# Patient Record
Sex: MALE | Race: WHITE | NOT HISPANIC OR LATINO | Employment: UNEMPLOYED | URBAN - METROPOLITAN AREA
[De-identification: names, ages, dates, MRNs, and addresses within clinical notes are randomized per-mention and may not be internally consistent; named-entity substitution may affect disease eponyms.]

---

## 2018-05-30 ENCOUNTER — OFFICE VISIT CONVERTED (OUTPATIENT)
Dept: PODIATRY | Facility: CLINIC | Age: 48
End: 2018-05-30
Attending: PODIATRIST

## 2018-06-13 ENCOUNTER — PROCEDURE VISIT CONVERTED (OUTPATIENT)
Dept: PODIATRY | Facility: CLINIC | Age: 48
End: 2018-06-13
Attending: PODIATRIST

## 2018-06-13 ENCOUNTER — CONVERSION ENCOUNTER (OUTPATIENT)
Dept: PODIATRY | Facility: CLINIC | Age: 48
End: 2018-06-13

## 2018-07-06 ENCOUNTER — PROCEDURE VISIT CONVERTED (OUTPATIENT)
Dept: PODIATRY | Facility: CLINIC | Age: 48
End: 2018-07-06
Attending: PODIATRIST

## 2018-07-26 ENCOUNTER — PROCEDURE VISIT CONVERTED (OUTPATIENT)
Dept: PODIATRY | Facility: CLINIC | Age: 48
End: 2018-07-26
Attending: PODIATRIST

## 2018-08-21 ENCOUNTER — CONVERSION ENCOUNTER (OUTPATIENT)
Dept: PODIATRY | Facility: CLINIC | Age: 48
End: 2018-08-21

## 2018-08-21 ENCOUNTER — PROCEDURE VISIT CONVERTED (OUTPATIENT)
Dept: PODIATRY | Facility: CLINIC | Age: 48
End: 2018-08-21
Attending: PODIATRIST

## 2018-11-08 ENCOUNTER — CONVERSION ENCOUNTER (OUTPATIENT)
Dept: OTHER | Facility: HOSPITAL | Age: 48
End: 2018-11-08

## 2018-11-08 ENCOUNTER — OFFICE VISIT CONVERTED (OUTPATIENT)
Dept: PODIATRY | Facility: CLINIC | Age: 48
End: 2018-11-08
Attending: PODIATRIST

## 2019-01-02 ENCOUNTER — HOSPITAL ENCOUNTER (OUTPATIENT)
Dept: WOUND CARE | Facility: HOSPITAL | Age: 49
Setting detail: RECURRING SERIES
Discharge: HOME | End: 2019-01-31
Attending: SURGERY

## 2019-01-02 LAB
GLUCOSE BLD-MCNC: 161 MG/DL (ref 70–99)
GLUCOSE BLD-MCNC: 255 MG/DL (ref 70–99)

## 2019-01-03 LAB
GLUCOSE BLD-MCNC: 129 MG/DL (ref 70–99)
GLUCOSE BLD-MCNC: 139 MG/DL (ref 70–99)

## 2019-01-04 LAB
GLUCOSE BLD-MCNC: 144 MG/DL (ref 70–99)
GLUCOSE BLD-MCNC: 172 MG/DL (ref 70–99)

## 2019-01-07 LAB
GLUCOSE BLD-MCNC: 177 MG/DL (ref 70–99)
GLUCOSE BLD-MCNC: 219 MG/DL (ref 70–99)

## 2019-01-08 LAB
GLUCOSE BLD-MCNC: 104 MG/DL (ref 70–99)
GLUCOSE BLD-MCNC: 150 MG/DL (ref 70–99)
GLUCOSE BLD-MCNC: 169 MG/DL (ref 70–99)

## 2019-01-09 LAB
GLUCOSE BLD-MCNC: 138 MG/DL (ref 70–99)
GLUCOSE BLD-MCNC: 152 MG/DL (ref 70–99)

## 2019-01-10 LAB — GLUCOSE BLD-MCNC: 167 MG/DL (ref 70–99)

## 2019-01-11 LAB
GLUCOSE BLD-MCNC: 161 MG/DL (ref 70–99)
GLUCOSE BLD-MCNC: 180 MG/DL (ref 70–99)

## 2019-01-14 LAB
GLUCOSE BLD-MCNC: 166 MG/DL (ref 70–99)
GLUCOSE BLD-MCNC: 225 MG/DL (ref 70–99)

## 2019-01-15 LAB
GLUCOSE BLD-MCNC: 204 MG/DL (ref 70–99)
GLUCOSE BLD-MCNC: 234 MG/DL (ref 70–99)

## 2019-01-16 LAB
GLUCOSE BLD-MCNC: 100 MG/DL (ref 70–99)
GLUCOSE BLD-MCNC: 136 MG/DL (ref 70–99)

## 2019-01-17 LAB
GLUCOSE BLD-MCNC: 186 MG/DL (ref 70–99)
GLUCOSE BLD-MCNC: 238 MG/DL (ref 70–99)

## 2019-01-18 LAB
GLUCOSE BLD-MCNC: 182 MG/DL (ref 70–99)
GLUCOSE BLD-MCNC: 209 MG/DL (ref 70–99)

## 2019-01-21 LAB
GLUCOSE BLD-MCNC: 209 MG/DL (ref 70–99)
GLUCOSE BLD-MCNC: 242 MG/DL (ref 70–99)

## 2019-01-22 LAB
GLUCOSE BLD-MCNC: 230 MG/DL (ref 70–99)
GLUCOSE BLD-MCNC: 284 MG/DL (ref 70–99)

## 2019-01-23 LAB
GLUCOSE BLD-MCNC: 181 MG/DL (ref 70–99)
GLUCOSE BLD-MCNC: 217 MG/DL (ref 70–99)

## 2019-01-24 LAB
GLUCOSE BLD-MCNC: 216 MG/DL (ref 70–99)
GLUCOSE BLD-MCNC: 228 MG/DL (ref 70–99)

## 2019-01-25 LAB
GLUCOSE BLD-MCNC: 204 MG/DL (ref 70–99)
GLUCOSE BLD-MCNC: 215 MG/DL (ref 70–99)

## 2019-01-28 LAB
GLUCOSE BLD-MCNC: 122 MG/DL (ref 70–99)
GLUCOSE BLD-MCNC: 151 MG/DL (ref 70–99)

## 2019-01-29 LAB
GLUCOSE BLD-MCNC: 211 MG/DL (ref 70–99)
GLUCOSE BLD-MCNC: 272 MG/DL (ref 70–99)

## 2019-01-30 LAB
GLUCOSE BLD-MCNC: 202 MG/DL (ref 70–99)
GLUCOSE BLD-MCNC: 224 MG/DL (ref 70–99)

## 2019-01-31 LAB
GLUCOSE BLD-MCNC: 253 MG/DL (ref 70–99)
GLUCOSE BLD-MCNC: 300 MG/DL (ref 70–99)

## 2019-02-01 ENCOUNTER — HOSPITAL ENCOUNTER (OUTPATIENT)
Dept: WOUND CARE | Facility: HOSPITAL | Age: 49
Setting detail: RECURRING SERIES
Discharge: HOME | End: 2019-02-28
Attending: SURGERY

## 2019-02-01 LAB
GLUCOSE BLD-MCNC: 158 MG/DL (ref 70–99)
GLUCOSE BLD-MCNC: 222 MG/DL (ref 70–99)

## 2019-02-04 LAB
GLUCOSE BLD-MCNC: 162 MG/DL (ref 70–99)
GLUCOSE BLD-MCNC: 191 MG/DL (ref 70–99)

## 2019-02-05 LAB
GLUCOSE BLD-MCNC: 156 MG/DL (ref 70–99)
GLUCOSE BLD-MCNC: 187 MG/DL (ref 70–99)

## 2019-02-06 LAB
GLUCOSE BLD-MCNC: 143 MG/DL (ref 70–99)
GLUCOSE BLD-MCNC: 147 MG/DL (ref 70–99)

## 2019-02-07 LAB
GLUCOSE BLD-MCNC: 168 MG/DL (ref 70–99)
GLUCOSE BLD-MCNC: 200 MG/DL (ref 70–99)

## 2019-02-08 LAB
GLUCOSE BLD-MCNC: 207 MG/DL (ref 70–99)
GLUCOSE BLD-MCNC: 283 MG/DL (ref 70–99)

## 2019-02-11 LAB
GLUCOSE BLD-MCNC: 127 MG/DL (ref 70–99)
GLUCOSE BLD-MCNC: 196 MG/DL (ref 70–99)

## 2019-02-12 LAB
GLUCOSE BLD-MCNC: 229 MG/DL (ref 70–99)
GLUCOSE BLD-MCNC: 286 MG/DL (ref 70–99)

## 2019-02-13 LAB
GLUCOSE BLD-MCNC: 171 MG/DL (ref 70–99)
GLUCOSE BLD-MCNC: 196 MG/DL (ref 70–99)

## 2019-02-14 LAB
GLUCOSE BLD-MCNC: 176 MG/DL (ref 70–99)
GLUCOSE BLD-MCNC: 268 MG/DL (ref 70–99)

## 2019-02-15 LAB
GLUCOSE BLD-MCNC: 176 MG/DL (ref 70–99)
GLUCOSE BLD-MCNC: 178 MG/DL (ref 70–99)

## 2019-02-18 LAB
GLUCOSE BLD-MCNC: 178 MG/DL (ref 70–99)
GLUCOSE BLD-MCNC: 201 MG/DL (ref 70–99)

## 2019-02-19 LAB
GLUCOSE BLD-MCNC: 123 MG/DL (ref 70–99)
GLUCOSE BLD-MCNC: 248 MG/DL (ref 70–99)

## 2019-02-20 LAB
GLUCOSE BLD-MCNC: 154 MG/DL (ref 70–99)
GLUCOSE BLD-MCNC: 163 MG/DL (ref 70–99)

## 2019-02-21 LAB
GLUCOSE BLD-MCNC: 116 MG/DL (ref 70–99)
GLUCOSE BLD-MCNC: 188 MG/DL (ref 70–99)

## 2019-02-22 LAB
GLUCOSE BLD-MCNC: 125 MG/DL (ref 70–99)
GLUCOSE BLD-MCNC: 150 MG/DL (ref 70–99)

## 2019-02-25 LAB
GLUCOSE BLD-MCNC: 206 MG/DL (ref 70–99)
GLUCOSE BLD-MCNC: 98 MG/DL (ref 70–99)

## 2019-03-14 ENCOUNTER — HOSPITAL ENCOUNTER (OUTPATIENT)
Dept: WOUND CARE | Facility: HOSPITAL | Age: 49
Setting detail: RECURRING SERIES
Discharge: HOME | End: 2019-03-31
Attending: SURGERY

## 2019-04-09 ENCOUNTER — HOSPITAL ENCOUNTER (OUTPATIENT)
Dept: MRI IMAGING | Facility: HOSPITAL | Age: 49
Discharge: HOME | End: 2019-04-09
Attending: SURGERY

## 2019-04-09 LAB
CREAT BLD-MCNC: 1 MG/DL (ref 0.6–1.4)
GFR SERPLBLD BASED ON 1.73 SQ M-ARVRAT: >60 ML/MIN/{1.73_M2}

## 2019-04-23 ENCOUNTER — OFFICE VISIT CONVERTED (OUTPATIENT)
Dept: CARDIOLOGY | Facility: CLINIC | Age: 49
End: 2019-04-23
Attending: INTERNAL MEDICINE

## 2019-05-03 ENCOUNTER — CONVERSION ENCOUNTER (OUTPATIENT)
Dept: CARDIOLOGY | Facility: CLINIC | Age: 49
End: 2019-05-03
Attending: INTERNAL MEDICINE

## 2019-06-17 ENCOUNTER — HOSPITAL ENCOUNTER (OUTPATIENT)
Dept: SURGERY | Facility: HOSPITAL | Age: 49
Setting detail: HOSPITAL OUTPATIENT SURGERY
Discharge: HOME | End: 2019-06-17
Attending: OPHTHALMOLOGY

## 2019-06-17 LAB — GLUCOSE BLD-MCNC: 144 MG/DL (ref 70–99)

## 2020-11-23 ENCOUNTER — HOSPITAL ENCOUNTER (OUTPATIENT)
Dept: OTHER | Facility: HOSPITAL | Age: 50
Discharge: HOME | End: 2020-11-23
Attending: INTERNAL MEDICINE

## 2020-11-23 LAB
ALBUMIN SERPL-MCNC: 3.9 G/DL (ref 3.5–5)
ALBUMIN/GLOB SERPL: 1.1 {RATIO} (ref 1.4–2.6)
ALP SERPL-CCNC: 92 U/L (ref 56–119)
ALT SERPL-CCNC: 45 U/L (ref 10–40)
ANION GAP SERPL CALC-SCNC: 13 MMOL/L (ref 8–19)
AST SERPL-CCNC: 29 U/L (ref 15–50)
BASOPHILS # BLD AUTO: 0.06 10*3/UL (ref 0–0.2)
BASOPHILS NFR BLD AUTO: 1.1 % (ref 0–3)
BILIRUB SERPL-MCNC: 0.21 MG/DL (ref 0.2–1.3)
BUN SERPL-MCNC: 17 MG/DL (ref 5–25)
BUN/CREAT SERPL: 20 {RATIO} (ref 6–20)
CALCIUM SERPL-MCNC: 9.8 MG/DL (ref 8.7–10.4)
CHLORIDE SERPL-SCNC: 104 MMOL/L (ref 99–111)
CONV ABS IMM GRAN: 0.05 10*3/UL (ref 0–0.2)
CONV CO2: 28 MMOL/L (ref 22–32)
CONV IMMATURE GRAN: 0.9 % (ref 0–1.8)
CONV TOTAL PROTEIN: 7.3 G/DL (ref 6.3–8.2)
CREAT UR-MCNC: 0.85 MG/DL (ref 0.7–1.2)
CRP SERPL-MCNC: 25.7 MG/L (ref 0–5)
DEPRECATED RDW RBC AUTO: 46.5 FL (ref 35.1–43.9)
EOSINOPHIL # BLD AUTO: 0.26 10*3/UL (ref 0–0.7)
EOSINOPHIL # BLD AUTO: 4.7 % (ref 0–7)
ERYTHROCYTE [DISTWIDTH] IN BLOOD BY AUTOMATED COUNT: 14.3 % (ref 11.6–14.4)
ERYTHROCYTE [SEDIMENTATION RATE] IN BLOOD: 53 MM/H (ref 0–20)
GFR SERPLBLD BASED ON 1.73 SQ M-ARVRAT: >60 ML/MIN/{1.73_M2}
GLOBULIN UR ELPH-MCNC: 3.4 G/DL (ref 2–3.5)
GLUCOSE SERPL-MCNC: 151 MG/DL (ref 70–99)
HCT VFR BLD AUTO: 34.4 % (ref 42–52)
HGB BLD-MCNC: 11 G/DL (ref 14–18)
LYMPHOCYTES # BLD AUTO: 1.36 10*3/UL (ref 1–5)
LYMPHOCYTES NFR BLD AUTO: 24.6 % (ref 20–45)
MCH RBC QN AUTO: 28.6 PG (ref 27–31)
MCHC RBC AUTO-ENTMCNC: 32 G/DL (ref 33–37)
MCV RBC AUTO: 89.6 FL (ref 80–96)
MONOCYTES # BLD AUTO: 0.52 10*3/UL (ref 0.2–1.2)
MONOCYTES NFR BLD AUTO: 9.4 % (ref 3–10)
NEUTROPHILS # BLD AUTO: 3.28 10*3/UL (ref 2–8)
NEUTROPHILS NFR BLD AUTO: 59.3 % (ref 30–85)
NRBC CBCN: 0 % (ref 0–0.7)
OSMOLALITY SERPL CALC.SUM OF ELEC: 296 MOSM/KG (ref 273–304)
PLATELET # BLD AUTO: 290 10*3/UL (ref 130–400)
PMV BLD AUTO: 10.1 FL (ref 9.4–12.4)
POTASSIUM SERPL-SCNC: 4 MMOL/L (ref 3.5–5.3)
RBC # BLD AUTO: 3.84 10*6/UL (ref 4.7–6.1)
SODIUM SERPL-SCNC: 141 MMOL/L (ref 135–147)
VANCOMYCIN TROUGH SERPL-MCNC: 19 UG/ML (ref 10–20)
WBC # BLD AUTO: 5.53 10*3/UL (ref 4.8–10.8)

## 2020-11-27 ENCOUNTER — HOSPITAL ENCOUNTER (OUTPATIENT)
Dept: OTHER | Facility: HOSPITAL | Age: 50
Discharge: HOME | End: 2020-11-27
Attending: INTERNAL MEDICINE

## 2020-11-30 ENCOUNTER — HOSPITAL ENCOUNTER (OUTPATIENT)
Dept: OTHER | Facility: HOSPITAL | Age: 50
Discharge: HOME | End: 2020-11-30
Attending: INTERNAL MEDICINE

## 2020-11-30 LAB
ANION GAP SERPL CALC-SCNC: 14 MMOL/L (ref 8–19)
BASOPHILS # BLD AUTO: 0.07 10*3/UL (ref 0–0.2)
BASOPHILS NFR BLD AUTO: 1.1 % (ref 0–3)
BUN SERPL-MCNC: 15 MG/DL (ref 5–25)
BUN/CREAT SERPL: 16 {RATIO} (ref 6–20)
CALCIUM SERPL-MCNC: 9.5 MG/DL (ref 8.7–10.4)
CHLORIDE SERPL-SCNC: 98 MMOL/L (ref 99–111)
CONV ABS IMM GRAN: 0.06 10*3/UL (ref 0–0.2)
CONV CO2: 27 MMOL/L (ref 22–32)
CONV IMMATURE GRAN: 1 % (ref 0–1.8)
CREAT UR-MCNC: 0.93 MG/DL (ref 0.7–1.2)
CRP SERPL-MCNC: 9.4 MG/L (ref 0–5)
DEPRECATED RDW RBC AUTO: 48.1 FL (ref 35.1–43.9)
EOSINOPHIL # BLD AUTO: 0.24 10*3/UL (ref 0–0.7)
EOSINOPHIL # BLD AUTO: 3.9 % (ref 0–7)
ERYTHROCYTE [DISTWIDTH] IN BLOOD BY AUTOMATED COUNT: 14.6 % (ref 11.6–14.4)
ERYTHROCYTE [SEDIMENTATION RATE] IN BLOOD: 39 MM/H (ref 0–20)
GFR SERPLBLD BASED ON 1.73 SQ M-ARVRAT: >60 ML/MIN/{1.73_M2}
GLUCOSE SERPL-MCNC: 214 MG/DL (ref 70–99)
HCT VFR BLD AUTO: 34.3 % (ref 42–52)
HGB BLD-MCNC: 11 G/DL (ref 14–18)
LYMPHOCYTES # BLD AUTO: 1.28 10*3/UL (ref 1–5)
LYMPHOCYTES NFR BLD AUTO: 20.9 % (ref 20–45)
MCH RBC QN AUTO: 28.9 PG (ref 27–31)
MCHC RBC AUTO-ENTMCNC: 32.1 G/DL (ref 33–37)
MCV RBC AUTO: 90.3 FL (ref 80–96)
MONOCYTES # BLD AUTO: 0.52 10*3/UL (ref 0.2–1.2)
MONOCYTES NFR BLD AUTO: 8.5 % (ref 3–10)
NEUTROPHILS # BLD AUTO: 3.95 10*3/UL (ref 2–8)
NEUTROPHILS NFR BLD AUTO: 64.6 % (ref 30–85)
NRBC CBCN: 0 % (ref 0–0.7)
OSMOLALITY SERPL CALC.SUM OF ELEC: 287 MOSM/KG (ref 273–304)
PLATELET # BLD AUTO: 324 10*3/UL (ref 130–400)
PMV BLD AUTO: 10.4 FL (ref 9.4–12.4)
POTASSIUM SERPL-SCNC: 4.2 MMOL/L (ref 3.5–5.3)
RBC # BLD AUTO: 3.8 10*6/UL (ref 4.7–6.1)
SODIUM SERPL-SCNC: 135 MMOL/L (ref 135–147)
VANCOMYCIN TROUGH SERPL-MCNC: 18 UG/ML (ref 10–20)
WBC # BLD AUTO: 6.12 10*3/UL (ref 4.8–10.8)

## 2020-12-07 ENCOUNTER — HOSPITAL ENCOUNTER (OUTPATIENT)
Dept: OTHER | Facility: HOSPITAL | Age: 50
Discharge: HOME | End: 2020-12-07
Attending: PODIATRIST

## 2020-12-07 LAB
ANION GAP SERPL CALC-SCNC: 18 MMOL/L (ref 8–19)
BASOPHILS # BLD AUTO: 0.08 10*3/UL (ref 0–0.2)
BASOPHILS NFR BLD AUTO: 1.5 % (ref 0–3)
BUN SERPL-MCNC: 16 MG/DL (ref 5–25)
BUN/CREAT SERPL: 19 {RATIO} (ref 6–20)
CALCIUM SERPL-MCNC: 8.9 MG/DL (ref 8.7–10.4)
CHLORIDE SERPL-SCNC: 98 MMOL/L (ref 99–111)
CONV ABS IMM GRAN: 0.04 10*3/UL (ref 0–0.2)
CONV CO2: 25 MMOL/L (ref 22–32)
CONV IMMATURE GRAN: 0.8 % (ref 0–1.8)
CREAT UR-MCNC: 0.83 MG/DL (ref 0.7–1.2)
CRP SERPL-MCNC: 13 MG/L (ref 0–5)
DEPRECATED RDW RBC AUTO: 48.7 FL (ref 35.1–43.9)
EOSINOPHIL # BLD AUTO: 0.28 10*3/UL (ref 0–0.7)
EOSINOPHIL # BLD AUTO: 5.4 % (ref 0–7)
ERYTHROCYTE [DISTWIDTH] IN BLOOD BY AUTOMATED COUNT: 14.7 % (ref 11.6–14.4)
ERYTHROCYTE [SEDIMENTATION RATE] IN BLOOD: 48 MM/H (ref 0–20)
GFR SERPLBLD BASED ON 1.73 SQ M-ARVRAT: >60 ML/MIN/{1.73_M2}
GLUCOSE SERPL-MCNC: 265 MG/DL (ref 70–99)
HCT VFR BLD AUTO: 37.2 % (ref 42–52)
HGB BLD-MCNC: 11.9 G/DL (ref 14–18)
LYMPHOCYTES # BLD AUTO: 1.1 10*3/UL (ref 1–5)
LYMPHOCYTES NFR BLD AUTO: 21 % (ref 20–45)
MCH RBC QN AUTO: 29 PG (ref 27–31)
MCHC RBC AUTO-ENTMCNC: 32 G/DL (ref 33–37)
MCV RBC AUTO: 90.5 FL (ref 80–96)
MONOCYTES # BLD AUTO: 0.5 10*3/UL (ref 0.2–1.2)
MONOCYTES NFR BLD AUTO: 9.6 % (ref 3–10)
NEUTROPHILS # BLD AUTO: 3.23 10*3/UL (ref 2–8)
NEUTROPHILS NFR BLD AUTO: 61.7 % (ref 30–85)
NRBC CBCN: 0 % (ref 0–0.7)
OSMOLALITY SERPL CALC.SUM OF ELEC: 292 MOSM/KG (ref 273–304)
PLATELET # BLD AUTO: 323 10*3/UL (ref 130–400)
PMV BLD AUTO: 10.3 FL (ref 9.4–12.4)
POTASSIUM SERPL-SCNC: 4.8 MMOL/L (ref 3.5–5.3)
RBC # BLD AUTO: 4.11 10*6/UL (ref 4.7–6.1)
SODIUM SERPL-SCNC: 136 MMOL/L (ref 135–147)
VANCOMYCIN TROUGH SERPL-MCNC: 15 UG/ML (ref 10–20)
WBC # BLD AUTO: 5.23 10*3/UL (ref 4.8–10.8)

## 2020-12-14 ENCOUNTER — HOSPITAL ENCOUNTER (OUTPATIENT)
Dept: OTHER | Facility: HOSPITAL | Age: 50
Discharge: HOME | End: 2020-12-14
Attending: PODIATRIST

## 2020-12-14 LAB
ANION GAP SERPL CALC-SCNC: 14 MMOL/L (ref 8–19)
BASOPHILS # BLD AUTO: 0.06 10*3/UL (ref 0–0.2)
BASOPHILS NFR BLD AUTO: 1 % (ref 0–3)
BUN SERPL-MCNC: 18 MG/DL (ref 5–25)
BUN/CREAT SERPL: 20 {RATIO} (ref 6–20)
CALCIUM SERPL-MCNC: 9.6 MG/DL (ref 8.7–10.4)
CHLORIDE SERPL-SCNC: 100 MMOL/L (ref 99–111)
CONV ABS IMM GRAN: 0.03 10*3/UL (ref 0–0.2)
CONV CO2: 29 MMOL/L (ref 22–32)
CONV IMMATURE GRAN: 0.5 % (ref 0–1.8)
CREAT UR-MCNC: 0.89 MG/DL (ref 0.7–1.2)
CRP SERPL-MCNC: 12.3 MG/L (ref 0–5)
DEPRECATED RDW RBC AUTO: 47.9 FL (ref 35.1–43.9)
EOSINOPHIL # BLD AUTO: 0.23 10*3/UL (ref 0–0.7)
EOSINOPHIL # BLD AUTO: 3.8 % (ref 0–7)
ERYTHROCYTE [DISTWIDTH] IN BLOOD BY AUTOMATED COUNT: 14.5 % (ref 11.6–14.4)
ERYTHROCYTE [SEDIMENTATION RATE] IN BLOOD: 30 MM/H (ref 0–20)
GFR SERPLBLD BASED ON 1.73 SQ M-ARVRAT: >60 ML/MIN/{1.73_M2}
GLUCOSE SERPL-MCNC: 149 MG/DL (ref 70–99)
HCT VFR BLD AUTO: 41.1 % (ref 42–52)
HGB BLD-MCNC: 13.3 G/DL (ref 14–18)
LYMPHOCYTES # BLD AUTO: 1.29 10*3/UL (ref 1–5)
LYMPHOCYTES NFR BLD AUTO: 21.4 % (ref 20–45)
MCH RBC QN AUTO: 29.3 PG (ref 27–31)
MCHC RBC AUTO-ENTMCNC: 32.4 G/DL (ref 33–37)
MCV RBC AUTO: 90.5 FL (ref 80–96)
MONOCYTES # BLD AUTO: 0.5 10*3/UL (ref 0.2–1.2)
MONOCYTES NFR BLD AUTO: 8.3 % (ref 3–10)
NEUTROPHILS # BLD AUTO: 3.91 10*3/UL (ref 2–8)
NEUTROPHILS NFR BLD AUTO: 65 % (ref 30–85)
NRBC CBCN: 0 % (ref 0–0.7)
OSMOLALITY SERPL CALC.SUM OF ELEC: 293 MOSM/KG (ref 273–304)
PLATELET # BLD AUTO: 286 10*3/UL (ref 130–400)
PMV BLD AUTO: 10.7 FL (ref 9.4–12.4)
POTASSIUM SERPL-SCNC: 4.3 MMOL/L (ref 3.5–5.3)
RBC # BLD AUTO: 4.54 10*6/UL (ref 4.7–6.1)
SODIUM SERPL-SCNC: 139 MMOL/L (ref 135–147)
VANCOMYCIN TROUGH SERPL-MCNC: 19 UG/ML (ref 10–20)
WBC # BLD AUTO: 6.02 10*3/UL (ref 4.8–10.8)

## 2020-12-18 ENCOUNTER — HOSPITAL ENCOUNTER (OUTPATIENT)
Dept: INFUSION THERAPY | Facility: HOSPITAL | Age: 50
Discharge: HOME | End: 2020-12-18
Attending: INTERNAL MEDICINE

## 2020-12-18 LAB
ANION GAP SERPL CALC-SCNC: 13 MMOL/L (ref 8–19)
BASOPHILS # BLD AUTO: 0.08 10*3/UL (ref 0–0.2)
BASOPHILS NFR BLD AUTO: 1.3 % (ref 0–3)
BUN SERPL-MCNC: 25 MG/DL (ref 5–25)
BUN/CREAT SERPL: 25 {RATIO} (ref 6–20)
CALCIUM SERPL-MCNC: 9.2 MG/DL (ref 8.7–10.4)
CHLORIDE SERPL-SCNC: 104 MMOL/L (ref 99–111)
CONV ABS IMM GRAN: 0.06 10*3/UL (ref 0–0.2)
CONV CO2: 26 MMOL/L (ref 22–32)
CONV IMMATURE GRAN: 1 % (ref 0–1.8)
CREAT UR-MCNC: 0.99 MG/DL (ref 0.7–1.2)
CRP SERPL-MCNC: 9.3 MG/L (ref 0–5)
DEPRECATED RDW RBC AUTO: 45.9 FL (ref 35.1–43.9)
EOSINOPHIL # BLD AUTO: 0.22 10*3/UL (ref 0–0.7)
EOSINOPHIL # BLD AUTO: 3.5 % (ref 0–7)
ERYTHROCYTE [DISTWIDTH] IN BLOOD BY AUTOMATED COUNT: 14.3 % (ref 11.6–14.4)
ERYTHROCYTE [SEDIMENTATION RATE] IN BLOOD: 32 MM/H (ref 0–20)
GFR SERPLBLD BASED ON 1.73 SQ M-ARVRAT: >60 ML/MIN/{1.73_M2}
GLUCOSE SERPL-MCNC: 130 MG/DL (ref 70–99)
HCT VFR BLD AUTO: 36.4 % (ref 42–52)
HGB BLD-MCNC: 11.8 G/DL (ref 14–18)
LYMPHOCYTES # BLD AUTO: 1.4 10*3/UL (ref 1–5)
LYMPHOCYTES NFR BLD AUTO: 22.3 % (ref 20–45)
MCH RBC QN AUTO: 28.8 PG (ref 27–31)
MCHC RBC AUTO-ENTMCNC: 32.4 G/DL (ref 33–37)
MCV RBC AUTO: 88.8 FL (ref 80–96)
MONOCYTES # BLD AUTO: 0.64 10*3/UL (ref 0.2–1.2)
MONOCYTES NFR BLD AUTO: 10.2 % (ref 3–10)
NEUTROPHILS # BLD AUTO: 3.89 10*3/UL (ref 2–8)
NEUTROPHILS NFR BLD AUTO: 61.7 % (ref 30–85)
NRBC CBCN: 0 % (ref 0–0.7)
OSMOLALITY SERPL CALC.SUM OF ELEC: 292 MOSM/KG (ref 273–304)
PLATELET # BLD AUTO: 290 10*3/UL (ref 130–400)
PMV BLD AUTO: 10.3 FL (ref 9.4–12.4)
POTASSIUM SERPL-SCNC: 4.5 MMOL/L (ref 3.5–5.3)
RBC # BLD AUTO: 4.1 10*6/UL (ref 4.7–6.1)
SODIUM SERPL-SCNC: 138 MMOL/L (ref 135–147)
VANCOMYCIN SERPL-MCNC: 11 UG/ML
WBC # BLD AUTO: 6.29 10*3/UL (ref 4.8–10.8)

## 2020-12-21 ENCOUNTER — HOSPITAL ENCOUNTER (OUTPATIENT)
Dept: OTHER | Facility: HOSPITAL | Age: 50
Discharge: HOME | End: 2020-12-21
Attending: INTERNAL MEDICINE

## 2020-12-21 LAB
ALBUMIN SERPL-MCNC: 4.3 G/DL (ref 3.5–5)
ALBUMIN/GLOB SERPL: 1.3 {RATIO} (ref 1.4–2.6)
ALP SERPL-CCNC: 98 U/L (ref 56–119)
ALT SERPL-CCNC: 41 U/L (ref 10–40)
ANION GAP SERPL CALC-SCNC: 13 MMOL/L (ref 8–19)
AST SERPL-CCNC: 25 U/L (ref 15–50)
BASOPHILS # BLD AUTO: 0.08 10*3/UL (ref 0–0.2)
BASOPHILS NFR BLD AUTO: 1.2 % (ref 0–3)
BILIRUB SERPL-MCNC: 0.38 MG/DL (ref 0.2–1.3)
BUN SERPL-MCNC: 17 MG/DL (ref 5–25)
BUN/CREAT SERPL: 19 {RATIO} (ref 6–20)
CALCIUM SERPL-MCNC: 9.5 MG/DL (ref 8.7–10.4)
CHLORIDE SERPL-SCNC: 102 MMOL/L (ref 99–111)
CONV ABS IMM GRAN: 0.04 10*3/UL (ref 0–0.2)
CONV CO2: 27 MMOL/L (ref 22–32)
CONV IMMATURE GRAN: 0.6 % (ref 0–1.8)
CONV TOTAL PROTEIN: 7.5 G/DL (ref 6.3–8.2)
CREAT UR-MCNC: 0.89 MG/DL (ref 0.7–1.2)
CRP SERPL-MCNC: 11 MG/L (ref 0–5)
DEPRECATED RDW RBC AUTO: 44.9 FL (ref 35.1–43.9)
EOSINOPHIL # BLD AUTO: 0.24 10*3/UL (ref 0–0.7)
EOSINOPHIL # BLD AUTO: 3.6 % (ref 0–7)
ERYTHROCYTE [DISTWIDTH] IN BLOOD BY AUTOMATED COUNT: 14.1 % (ref 11.6–14.4)
ERYTHROCYTE [SEDIMENTATION RATE] IN BLOOD: 32 MM/H (ref 0–20)
GFR SERPLBLD BASED ON 1.73 SQ M-ARVRAT: >60 ML/MIN/{1.73_M2}
GLOBULIN UR ELPH-MCNC: 3.2 G/DL (ref 2–3.5)
GLUCOSE SERPL-MCNC: 139 MG/DL (ref 70–99)
HCT VFR BLD AUTO: 37.4 % (ref 42–52)
HGB BLD-MCNC: 12.4 G/DL (ref 14–18)
LYMPHOCYTES # BLD AUTO: 1.39 10*3/UL (ref 1–5)
LYMPHOCYTES NFR BLD AUTO: 20.9 % (ref 20–45)
MCH RBC QN AUTO: 28.9 PG (ref 27–31)
MCHC RBC AUTO-ENTMCNC: 33.2 G/DL (ref 33–37)
MCV RBC AUTO: 87.2 FL (ref 80–96)
MONOCYTES # BLD AUTO: 0.56 10*3/UL (ref 0.2–1.2)
MONOCYTES NFR BLD AUTO: 8.4 % (ref 3–10)
NEUTROPHILS # BLD AUTO: 4.34 10*3/UL (ref 2–8)
NEUTROPHILS NFR BLD AUTO: 65.3 % (ref 30–85)
NRBC CBCN: 0 % (ref 0–0.7)
OSMOLALITY SERPL CALC.SUM OF ELEC: 290 MOSM/KG (ref 273–304)
PLATELET # BLD AUTO: 275 10*3/UL (ref 130–400)
PMV BLD AUTO: 9.5 FL (ref 9.4–12.4)
POTASSIUM SERPL-SCNC: 4.4 MMOL/L (ref 3.5–5.3)
RBC # BLD AUTO: 4.29 10*6/UL (ref 4.7–6.1)
SODIUM SERPL-SCNC: 138 MMOL/L (ref 135–147)
VANCOMYCIN TROUGH SERPL-MCNC: 11 UG/ML (ref 10–20)
WBC # BLD AUTO: 6.65 10*3/UL (ref 4.8–10.8)

## 2020-12-29 ENCOUNTER — HOSPITAL ENCOUNTER (OUTPATIENT)
Dept: OTHER | Facility: HOSPITAL | Age: 50
Discharge: HOME | End: 2020-12-29
Attending: PODIATRIST

## 2020-12-29 LAB
ANION GAP SERPL CALC-SCNC: 14 MMOL/L (ref 8–19)
BASOPHILS # BLD AUTO: 0.05 10*3/UL (ref 0–0.2)
BASOPHILS NFR BLD AUTO: 0.9 % (ref 0–3)
BUN SERPL-MCNC: 18 MG/DL (ref 5–25)
BUN/CREAT SERPL: 20 {RATIO} (ref 6–20)
CALCIUM SERPL-MCNC: 9.2 MG/DL (ref 8.7–10.4)
CHLORIDE SERPL-SCNC: 103 MMOL/L (ref 99–111)
CONV ABS IMM GRAN: 0.02 10*3/UL (ref 0–0.2)
CONV CO2: 27 MMOL/L (ref 22–32)
CONV IMMATURE GRAN: 0.3 % (ref 0–1.8)
CREAT UR-MCNC: 0.89 MG/DL (ref 0.7–1.2)
CRP SERPL-MCNC: 15.9 MG/L (ref 0–5)
DEPRECATED RDW RBC AUTO: 47.8 FL (ref 35.1–43.9)
EOSINOPHIL # BLD AUTO: 0.26 10*3/UL (ref 0–0.7)
EOSINOPHIL # BLD AUTO: 4.4 % (ref 0–7)
ERYTHROCYTE [DISTWIDTH] IN BLOOD BY AUTOMATED COUNT: 14.4 % (ref 11.6–14.4)
ERYTHROCYTE [SEDIMENTATION RATE] IN BLOOD: 34 MM/H (ref 0–20)
EST. AVERAGE GLUCOSE BLD GHB EST-MCNC: 194 MG/DL
GFR SERPLBLD BASED ON 1.73 SQ M-ARVRAT: >60 ML/MIN/{1.73_M2}
GLUCOSE SERPL-MCNC: 108 MG/DL (ref 70–99)
HBA1C MFR BLD: 8.4 % (ref 3.5–5.7)
HCT VFR BLD AUTO: 39.2 % (ref 42–52)
HGB BLD-MCNC: 12.6 G/DL (ref 14–18)
LYMPHOCYTES # BLD AUTO: 1.18 10*3/UL (ref 1–5)
LYMPHOCYTES NFR BLD AUTO: 20.1 % (ref 20–45)
MCH RBC QN AUTO: 29 PG (ref 27–31)
MCHC RBC AUTO-ENTMCNC: 32.1 G/DL (ref 33–37)
MCV RBC AUTO: 90.3 FL (ref 80–96)
MONOCYTES # BLD AUTO: 0.47 10*3/UL (ref 0.2–1.2)
MONOCYTES NFR BLD AUTO: 8 % (ref 3–10)
NEUTROPHILS # BLD AUTO: 3.88 10*3/UL (ref 2–8)
NEUTROPHILS NFR BLD AUTO: 66.3 % (ref 30–85)
NRBC CBCN: 0 % (ref 0–0.7)
OSMOLALITY SERPL CALC.SUM OF ELEC: 292 MOSM/KG (ref 273–304)
PLATELET # BLD AUTO: 287 10*3/UL (ref 130–400)
PMV BLD AUTO: 9.8 FL (ref 9.4–12.4)
POTASSIUM SERPL-SCNC: 3.9 MMOL/L (ref 3.5–5.3)
RBC # BLD AUTO: 4.34 10*6/UL (ref 4.7–6.1)
SODIUM SERPL-SCNC: 140 MMOL/L (ref 135–147)
VANCOMYCIN TROUGH SERPL-MCNC: 16 UG/ML (ref 10–20)
WBC # BLD AUTO: 5.86 10*3/UL (ref 4.8–10.8)

## 2021-01-04 ENCOUNTER — HOSPITAL ENCOUNTER (OUTPATIENT)
Dept: OTHER | Facility: HOSPITAL | Age: 51
Discharge: HOME | End: 2021-01-04
Attending: PODIATRIST

## 2021-01-04 LAB
ALBUMIN SERPL-MCNC: 4.5 G/DL (ref 3.5–5)
ALBUMIN/GLOB SERPL: 1.3 {RATIO} (ref 1.4–2.6)
ALP SERPL-CCNC: 73 U/L (ref 56–119)
ALT SERPL-CCNC: 38 U/L (ref 10–40)
ANION GAP SERPL CALC-SCNC: 18 MMOL/L (ref 8–19)
AST SERPL-CCNC: 28 U/L (ref 15–50)
BASOPHILS # BLD AUTO: 0.07 10*3/UL (ref 0–0.2)
BASOPHILS NFR BLD AUTO: 1.2 % (ref 0–3)
BILIRUB SERPL-MCNC: 0.27 MG/DL (ref 0.2–1.3)
BUN SERPL-MCNC: 17 MG/DL (ref 5–25)
BUN/CREAT SERPL: 17 {RATIO} (ref 6–20)
CALCIUM SERPL-MCNC: 9.3 MG/DL (ref 8.7–10.4)
CHLORIDE SERPL-SCNC: 105 MMOL/L (ref 99–111)
CONV ABS IMM GRAN: 0.04 10*3/UL (ref 0–0.2)
CONV CO2: 22 MMOL/L (ref 22–32)
CONV IMMATURE GRAN: 0.7 % (ref 0–1.8)
CONV TOTAL PROTEIN: 7.9 G/DL (ref 6.3–8.2)
CREAT UR-MCNC: 1.01 MG/DL (ref 0.7–1.2)
CRP SERPL-MCNC: 11.7 MG/L (ref 0–5)
DEPRECATED RDW RBC AUTO: 46.8 FL (ref 35.1–43.9)
EOSINOPHIL # BLD AUTO: 0.25 10*3/UL (ref 0–0.7)
EOSINOPHIL # BLD AUTO: 4.3 % (ref 0–7)
ERYTHROCYTE [DISTWIDTH] IN BLOOD BY AUTOMATED COUNT: 14.2 % (ref 11.6–14.4)
ERYTHROCYTE [SEDIMENTATION RATE] IN BLOOD: 25 MM/H (ref 0–20)
GFR SERPLBLD BASED ON 1.73 SQ M-ARVRAT: >60 ML/MIN/{1.73_M2}
GLOBULIN UR ELPH-MCNC: 3.4 G/DL (ref 2–3.5)
GLUCOSE SERPL-MCNC: 63 MG/DL (ref 70–99)
HCT VFR BLD AUTO: 41.5 % (ref 42–52)
HGB BLD-MCNC: 13.4 G/DL (ref 14–18)
LYMPHOCYTES # BLD AUTO: 1.41 10*3/UL (ref 1–5)
LYMPHOCYTES NFR BLD AUTO: 24.2 % (ref 20–45)
MCH RBC QN AUTO: 29 PG (ref 27–31)
MCHC RBC AUTO-ENTMCNC: 32.3 G/DL (ref 33–37)
MCV RBC AUTO: 89.8 FL (ref 80–96)
MONOCYTES # BLD AUTO: 0.59 10*3/UL (ref 0.2–1.2)
MONOCYTES NFR BLD AUTO: 10.1 % (ref 3–10)
NEUTROPHILS # BLD AUTO: 3.46 10*3/UL (ref 2–8)
NEUTROPHILS NFR BLD AUTO: 59.5 % (ref 30–85)
NRBC CBCN: 0 % (ref 0–0.7)
OSMOLALITY SERPL CALC.SUM OF ELEC: 290 MOSM/KG (ref 273–304)
PLATELET # BLD AUTO: 234 10*3/UL (ref 130–400)
PMV BLD AUTO: 11.1 FL (ref 9.4–12.4)
POTASSIUM SERPL-SCNC: 4.8 MMOL/L (ref 3.5–5.3)
RBC # BLD AUTO: 4.62 10*6/UL (ref 4.7–6.1)
SODIUM SERPL-SCNC: 140 MMOL/L (ref 135–147)
VANCOMYCIN TROUGH SERPL-MCNC: 16 UG/ML (ref 10–20)
WBC # BLD AUTO: 5.82 10*3/UL (ref 4.8–10.8)

## 2021-01-07 ENCOUNTER — HOSPITAL ENCOUNTER (OUTPATIENT)
Dept: OTHER | Facility: HOSPITAL | Age: 51
Discharge: HOME | End: 2021-01-07
Attending: INTERNAL MEDICINE

## 2021-01-07 LAB
ANION GAP SERPL CALC-SCNC: 13 MMOL/L (ref 8–19)
BASOPHILS # BLD AUTO: 0.07 10*3/UL (ref 0–0.2)
BASOPHILS NFR BLD AUTO: 1 % (ref 0–3)
BUN SERPL-MCNC: 13 MG/DL (ref 5–25)
BUN/CREAT SERPL: 14 {RATIO} (ref 6–20)
CALCIUM SERPL-MCNC: 10.2 MG/DL (ref 8.7–10.4)
CHLORIDE SERPL-SCNC: 100 MMOL/L (ref 99–111)
CONV ABS IMM GRAN: 0.02 10*3/UL (ref 0–0.2)
CONV CO2: 30 MMOL/L (ref 22–32)
CONV IMMATURE GRAN: 0.3 % (ref 0–1.8)
CREAT UR-MCNC: 0.94 MG/DL (ref 0.7–1.2)
CRP SERPL-MCNC: 10.2 MG/L (ref 0–5)
DEPRECATED RDW RBC AUTO: 46.4 FL (ref 35.1–43.9)
EOSINOPHIL # BLD AUTO: 0.4 10*3/UL (ref 0–0.7)
EOSINOPHIL # BLD AUTO: 5.8 % (ref 0–7)
ERYTHROCYTE [DISTWIDTH] IN BLOOD BY AUTOMATED COUNT: 14.1 % (ref 11.6–14.4)
ERYTHROCYTE [SEDIMENTATION RATE] IN BLOOD: 30 MM/H (ref 0–20)
GFR SERPLBLD BASED ON 1.73 SQ M-ARVRAT: >60 ML/MIN/{1.73_M2}
GLUCOSE SERPL-MCNC: 143 MG/DL (ref 70–99)
HCT VFR BLD AUTO: 39.7 % (ref 42–52)
HGB BLD-MCNC: 12.9 G/DL (ref 14–18)
LYMPHOCYTES # BLD AUTO: 1.47 10*3/UL (ref 1–5)
LYMPHOCYTES NFR BLD AUTO: 21.2 % (ref 20–45)
MCH RBC QN AUTO: 29 PG (ref 27–31)
MCHC RBC AUTO-ENTMCNC: 32.5 G/DL (ref 33–37)
MCV RBC AUTO: 89.2 FL (ref 80–96)
MONOCYTES # BLD AUTO: 0.68 10*3/UL (ref 0.2–1.2)
MONOCYTES NFR BLD AUTO: 9.8 % (ref 3–10)
NEUTROPHILS # BLD AUTO: 4.29 10*3/UL (ref 2–8)
NEUTROPHILS NFR BLD AUTO: 61.9 % (ref 30–85)
NRBC CBCN: 0 % (ref 0–0.7)
OSMOLALITY SERPL CALC.SUM OF ELEC: 291 MOSM/KG (ref 273–304)
PLATELET # BLD AUTO: 342 10*3/UL (ref 130–400)
PMV BLD AUTO: 9.7 FL (ref 9.4–12.4)
POTASSIUM SERPL-SCNC: 4.4 MMOL/L (ref 3.5–5.3)
RBC # BLD AUTO: 4.45 10*6/UL (ref 4.7–6.1)
SODIUM SERPL-SCNC: 139 MMOL/L (ref 135–147)
VANCOMYCIN TROUGH SERPL-MCNC: 13 UG/ML (ref 10–20)
WBC # BLD AUTO: 6.93 10*3/UL (ref 4.8–10.8)

## 2021-05-15 VITALS
HEIGHT: 70 IN | WEIGHT: 309 LBS | DIASTOLIC BLOOD PRESSURE: 92 MMHG | BODY MASS INDEX: 44.24 KG/M2 | HEART RATE: 66 BPM | SYSTOLIC BLOOD PRESSURE: 152 MMHG

## 2021-05-16 VITALS — WEIGHT: 276 LBS | OXYGEN SATURATION: 97 % | HEART RATE: 94 BPM | BODY MASS INDEX: 39.51 KG/M2 | HEIGHT: 70 IN

## 2021-05-16 VITALS
SYSTOLIC BLOOD PRESSURE: 116 MMHG | HEIGHT: 70 IN | HEART RATE: 90 BPM | BODY MASS INDEX: 39.98 KG/M2 | OXYGEN SATURATION: 98 % | DIASTOLIC BLOOD PRESSURE: 64 MMHG | WEIGHT: 279.25 LBS

## 2021-05-16 VITALS — HEIGHT: 70 IN | HEART RATE: 85 BPM | BODY MASS INDEX: 40.09 KG/M2 | OXYGEN SATURATION: 96 % | WEIGHT: 280 LBS

## 2021-05-16 VITALS
BODY MASS INDEX: 41.23 KG/M2 | HEIGHT: 70 IN | DIASTOLIC BLOOD PRESSURE: 87 MMHG | OXYGEN SATURATION: 99 % | HEART RATE: 78 BPM | SYSTOLIC BLOOD PRESSURE: 154 MMHG | WEIGHT: 288 LBS

## 2021-05-16 VITALS — HEIGHT: 70 IN | BODY MASS INDEX: 41.52 KG/M2 | WEIGHT: 290 LBS | HEART RATE: 97 BPM | OXYGEN SATURATION: 96 %

## 2021-05-16 VITALS — HEIGHT: 70 IN | BODY MASS INDEX: 40.8 KG/M2 | WEIGHT: 285 LBS | OXYGEN SATURATION: 92 % | HEART RATE: 88 BPM

## 2021-10-19 ENCOUNTER — TELEPHONE (OUTPATIENT)
Dept: GASTROENTEROLOGY | Facility: CLINIC | Age: 51
End: 2021-10-19

## 2021-10-19 ENCOUNTER — PREP FOR SURGERY (OUTPATIENT)
Dept: OTHER | Facility: HOSPITAL | Age: 51
End: 2021-10-19

## 2021-10-19 ENCOUNTER — CLINICAL SUPPORT (OUTPATIENT)
Dept: GASTROENTEROLOGY | Facility: CLINIC | Age: 51
End: 2021-10-19

## 2021-10-19 DIAGNOSIS — Z12.11 SCREENING FOR MALIGNANT NEOPLASM OF COLON: Primary | ICD-10-CM

## 2021-10-19 RX ORDER — LOSARTAN POTASSIUM 100 MG/1
100 TABLET ORAL DAILY
COMMUNITY
Start: 2021-09-28

## 2021-10-19 RX ORDER — HYDROCHLOROTHIAZIDE 25 MG/1
25 TABLET ORAL DAILY
COMMUNITY
Start: 2021-09-28

## 2021-10-19 RX ORDER — EMPAGLIFLOZIN 10 MG/1
TABLET, FILM COATED ORAL
COMMUNITY
Start: 2021-09-28

## 2021-10-19 RX ORDER — LEVOTHYROXINE SODIUM 0.03 MG/1
TABLET ORAL
COMMUNITY
Start: 2021-07-26

## 2021-10-19 RX ORDER — LINACLOTIDE 145 UG/1
145 CAPSULE, GELATIN COATED ORAL DAILY
COMMUNITY
Start: 2021-09-28

## 2021-10-19 RX ORDER — INSULIN LISPRO 100 [IU]/ML
INJECTION, SOLUTION INTRAVENOUS; SUBCUTANEOUS
COMMUNITY
Start: 2021-09-21

## 2021-10-19 RX ORDER — DAPAGLIFLOZIN 5 MG/1
5 TABLET, FILM COATED ORAL DAILY
COMMUNITY
Start: 2021-10-05

## 2021-10-19 RX ORDER — LINAGLIPTIN 5 MG/1
5 TABLET, FILM COATED ORAL DAILY
COMMUNITY
Start: 2021-09-28

## 2021-10-19 RX ORDER — ATORVASTATIN CALCIUM 20 MG/1
20 TABLET, FILM COATED ORAL
COMMUNITY
Start: 2021-09-28

## 2021-10-19 RX ORDER — METOPROLOL SUCCINATE 50 MG/1
TABLET, EXTENDED RELEASE ORAL
COMMUNITY
Start: 2021-09-28

## 2021-10-19 NOTE — TELEPHONE ENCOUNTER
Grayson Rosas  REASON FOR CALL Colon Screening   SENT IN PREP [facility]   No past medical history on file.  No Known Allergies  No past surgical history on file.  Social History     Socioeconomic History   • Marital status: Single   Tobacco Use   • Smoking status: Never Smoker   Vaping Use   • Vaping Use: Former   Substance and Sexual Activity   • Alcohol use: Not Currently     Family History   Problem Relation Age of Onset   • Colon cancer Mother 77       Current Outpatient Medications:   •  atorvastatin (LIPITOR) 20 MG tablet, Take 20 mg by mouth every night at bedtime., Disp: , Rfl:   •  Farxiga 5 MG tablet tablet, Take 5 mg by mouth Daily., Disp: , Rfl:   •  HumaLOG KwikPen 100 UNIT/ML solution pen-injector, INJECT 25 UNITS SUBCUTANEOUSLY THREE TIMES A DAY BEFORE MEALS, Disp: , Rfl:   •  hydroCHLOROthiazide (HYDRODIURIL) 25 MG tablet, Take 25 mg by mouth Daily., Disp: , Rfl:   •  Jardiance 10 MG tablet tablet, TAKE ONE TABLET BY MOUTH ONCE EVERY MORNING, Disp: , Rfl:   •  levothyroxine (SYNTHROID, LEVOTHROID) 25 MCG tablet, 1 TAB(S) ONCE A DAY ORALLY 90 DAYS, Disp: , Rfl:   •  Linzess 145 MCG capsule capsule, Take 145 mcg by mouth Daily., Disp: , Rfl:   •  losartan (COZAAR) 100 MG tablet, Take 100 mg by mouth Daily., Disp: , Rfl:   •  metoprolol succinate XL (TOPROL-XL) 50 MG 24 hr tablet, TAKE ONE TABLET BY MOUTH ONCE EVERY MORNING AND TAKE 1/2 TABLET ONCE EVERY EVENING, Disp: , Rfl:   •  Tradjenta 5 MG tablet tablet, Take 5 mg by mouth Daily., Disp: , Rfl:

## 2021-10-21 PROBLEM — Z12.11 SCREENING FOR MALIGNANT NEOPLASM OF COLON: Status: ACTIVE | Noted: 2021-10-21

## 2023-07-17 ENCOUNTER — APPOINTMENT (OUTPATIENT)
Dept: CARDIOLOGY | Facility: HOSPITAL | Age: 53
DRG: 617 | End: 2023-07-17
Payer: MEDICARE

## 2023-07-17 ENCOUNTER — HOSPITAL ENCOUNTER (INPATIENT)
Facility: HOSPITAL | Age: 53
LOS: 3 days | Discharge: HOME | DRG: 617 | End: 2023-07-20
Attending: EMERGENCY MEDICINE | Admitting: PODIATRIST
Payer: MEDICARE

## 2023-07-17 ENCOUNTER — APPOINTMENT (OUTPATIENT)
Dept: MRI IMAGING | Facility: HOSPITAL | Age: 53
DRG: 617 | End: 2023-07-17
Payer: MEDICARE

## 2023-07-17 DIAGNOSIS — M86.171 OTHER ACUTE OSTEOMYELITIS OF RIGHT FOOT: ICD-10-CM

## 2023-07-17 DIAGNOSIS — M86.9 OSTEOMYELITIS OF RIGHT FOOT, UNSPECIFIED TYPE: Primary | ICD-10-CM

## 2023-07-17 PROBLEM — I10 ESSENTIAL HYPERTENSION: Status: ACTIVE | Noted: 2023-07-17

## 2023-07-17 PROBLEM — E11.65 UNCONTROLLED DIABETES MELLITUS WITH HYPERGLYCEMIA: Status: ACTIVE | Noted: 2023-07-17

## 2023-07-17 LAB
ALBUMIN SERPL-MCNC: 4.2 G/DL (ref 3.5–5.2)
ALBUMIN/GLOB SERPL: 1.2 G/DL
ALP SERPL-CCNC: 93 U/L (ref 39–117)
ALT SERPL W P-5'-P-CCNC: 18 U/L (ref 1–41)
ANION GAP SERPL CALCULATED.3IONS-SCNC: 13.2 MMOL/L (ref 5–15)
AST SERPL-CCNC: 14 U/L (ref 1–40)
BASOPHILS # BLD AUTO: 0.07 10*3/MM3 (ref 0–0.2)
BASOPHILS NFR BLD AUTO: 0.7 % (ref 0–1.5)
BILIRUB SERPL-MCNC: 0.3 MG/DL (ref 0–1.2)
BUN SERPL-MCNC: 20 MG/DL (ref 6–20)
BUN/CREAT SERPL: 15.7 (ref 7–25)
CALCIUM SPEC-SCNC: 9.3 MG/DL (ref 8.6–10.5)
CHLORIDE SERPL-SCNC: 101 MMOL/L (ref 98–107)
CO2 SERPL-SCNC: 24.8 MMOL/L (ref 22–29)
CREAT SERPL-MCNC: 1.27 MG/DL (ref 0.76–1.27)
D-LACTATE SERPL-SCNC: 1.4 MMOL/L (ref 0.5–2)
DEPRECATED RDW RBC AUTO: 44.9 FL (ref 37–54)
EGFRCR SERPLBLD CKD-EPI 2021: 66.7 ML/MIN/1.73
EOSINOPHIL # BLD AUTO: 0.32 10*3/MM3 (ref 0–0.4)
EOSINOPHIL NFR BLD AUTO: 3 % (ref 0.3–6.2)
ERYTHROCYTE [DISTWIDTH] IN BLOOD BY AUTOMATED COUNT: 13.9 % (ref 12.3–15.4)
GLOBULIN UR ELPH-MCNC: 3.6 GM/DL
GLUCOSE SERPL-MCNC: 175 MG/DL (ref 65–99)
HCT VFR BLD AUTO: 41.1 % (ref 37.5–51)
HGB BLD-MCNC: 13.6 G/DL (ref 13–17.7)
HOLD SPECIMEN: NORMAL
HOLD SPECIMEN: NORMAL
IMM GRANULOCYTES # BLD AUTO: 0.1 10*3/MM3 (ref 0–0.05)
IMM GRANULOCYTES NFR BLD AUTO: 0.9 % (ref 0–0.5)
LYMPHOCYTES # BLD AUTO: 1.39 10*3/MM3 (ref 0.7–3.1)
LYMPHOCYTES NFR BLD AUTO: 13 % (ref 19.6–45.3)
MCH RBC QN AUTO: 29.5 PG (ref 26.6–33)
MCHC RBC AUTO-ENTMCNC: 33.1 G/DL (ref 31.5–35.7)
MCV RBC AUTO: 89.2 FL (ref 79–97)
MONOCYTES # BLD AUTO: 0.77 10*3/MM3 (ref 0.1–0.9)
MONOCYTES NFR BLD AUTO: 7.2 % (ref 5–12)
NEUTROPHILS NFR BLD AUTO: 75.2 % (ref 42.7–76)
NEUTROPHILS NFR BLD AUTO: 8.02 10*3/MM3 (ref 1.7–7)
NRBC BLD AUTO-RTO: 0 /100 WBC (ref 0–0.2)
PLATELET # BLD AUTO: 371 10*3/MM3 (ref 140–450)
PMV BLD AUTO: 9.1 FL (ref 6–12)
POTASSIUM SERPL-SCNC: 4.5 MMOL/L (ref 3.5–5.2)
PROT SERPL-MCNC: 7.8 G/DL (ref 6–8.5)
RBC # BLD AUTO: 4.61 10*6/MM3 (ref 4.14–5.8)
SODIUM SERPL-SCNC: 139 MMOL/L (ref 136–145)
WBC NRBC COR # BLD: 10.67 10*3/MM3 (ref 3.4–10.8)
WHOLE BLOOD HOLD COAG: NORMAL
WHOLE BLOOD HOLD SPECIMEN: NORMAL

## 2023-07-17 PROCEDURE — 73718 MRI LOWER EXTREMITY W/O DYE: CPT

## 2023-07-17 PROCEDURE — 85025 COMPLETE CBC W/AUTO DIFF WBC: CPT

## 2023-07-17 PROCEDURE — 93922 UPR/L XTREMITY ART 2 LEVELS: CPT

## 2023-07-17 PROCEDURE — 36415 COLL VENOUS BLD VENIPUNCTURE: CPT

## 2023-07-17 PROCEDURE — 83605 ASSAY OF LACTIC ACID: CPT

## 2023-07-17 PROCEDURE — 93922 UPR/L XTREMITY ART 2 LEVELS: CPT | Performed by: SURGERY

## 2023-07-17 PROCEDURE — 25010000002 VANCOMYCIN 5 G RECONSTITUTED SOLUTION

## 2023-07-17 PROCEDURE — 87040 BLOOD CULTURE FOR BACTERIA: CPT

## 2023-07-17 PROCEDURE — 25010000002 ENOXAPARIN PER 10 MG: Performed by: INTERNAL MEDICINE

## 2023-07-17 PROCEDURE — 99284 EMERGENCY DEPT VISIT MOD MDM: CPT

## 2023-07-17 PROCEDURE — 86140 C-REACTIVE PROTEIN: CPT | Performed by: PODIATRIST

## 2023-07-17 PROCEDURE — 80053 COMPREHEN METABOLIC PANEL: CPT

## 2023-07-17 RX ORDER — NALOXONE HCL 0.4 MG/ML
0.4 VIAL (ML) INJECTION
Status: DISCONTINUED | OUTPATIENT
Start: 2023-07-17 | End: 2023-07-20 | Stop reason: HOSPADM

## 2023-07-17 RX ORDER — SODIUM CHLORIDE 0.9 % (FLUSH) 0.9 %
10 SYRINGE (ML) INJECTION AS NEEDED
Status: DISCONTINUED | OUTPATIENT
Start: 2023-07-17 | End: 2023-07-20 | Stop reason: HOSPADM

## 2023-07-17 RX ORDER — ENOXAPARIN SODIUM 100 MG/ML
40 INJECTION SUBCUTANEOUS EVERY 12 HOURS SCHEDULED
Status: DISCONTINUED | OUTPATIENT
Start: 2023-07-17 | End: 2023-07-20 | Stop reason: HOSPADM

## 2023-07-17 RX ORDER — DEXTROSE MONOHYDRATE 25 G/50ML
25 INJECTION, SOLUTION INTRAVENOUS
Status: DISCONTINUED | OUTPATIENT
Start: 2023-07-17 | End: 2023-07-20 | Stop reason: HOSPADM

## 2023-07-17 RX ORDER — ALPRAZOLAM 0.25 MG/1
0.25 TABLET ORAL EVERY 6 HOURS PRN
Status: DISCONTINUED | OUTPATIENT
Start: 2023-07-17 | End: 2023-07-20 | Stop reason: HOSPADM

## 2023-07-17 RX ORDER — INSULIN GLARGINE 100 [IU]/ML
35 INJECTION, SOLUTION SUBCUTANEOUS 2 TIMES DAILY
COMMUNITY

## 2023-07-17 RX ORDER — ALUMINA, MAGNESIA, AND SIMETHICONE 2400; 2400; 240 MG/30ML; MG/30ML; MG/30ML
15 SUSPENSION ORAL EVERY 6 HOURS PRN
Status: DISCONTINUED | OUTPATIENT
Start: 2023-07-17 | End: 2023-07-20 | Stop reason: HOSPADM

## 2023-07-17 RX ORDER — ATORVASTATIN CALCIUM 20 MG/1
20 TABLET, FILM COATED ORAL NIGHTLY
Status: DISCONTINUED | OUTPATIENT
Start: 2023-07-17 | End: 2023-07-20 | Stop reason: HOSPADM

## 2023-07-17 RX ORDER — SODIUM CHLORIDE 9 MG/ML
40 INJECTION, SOLUTION INTRAVENOUS AS NEEDED
Status: DISCONTINUED | OUTPATIENT
Start: 2023-07-17 | End: 2023-07-20 | Stop reason: HOSPADM

## 2023-07-17 RX ORDER — LEVOTHYROXINE SODIUM 0.03 MG/1
25 TABLET ORAL
Status: DISCONTINUED | OUTPATIENT
Start: 2023-07-18 | End: 2023-07-20 | Stop reason: HOSPADM

## 2023-07-17 RX ORDER — POLYETHYLENE GLYCOL 3350 17 G/17G
17 POWDER, FOR SOLUTION ORAL DAILY PRN
Status: DISCONTINUED | OUTPATIENT
Start: 2023-07-17 | End: 2023-07-20 | Stop reason: HOSPADM

## 2023-07-17 RX ORDER — AMOXICILLIN 250 MG
2 CAPSULE ORAL 2 TIMES DAILY
Status: DISCONTINUED | OUTPATIENT
Start: 2023-07-17 | End: 2023-07-20 | Stop reason: HOSPADM

## 2023-07-17 RX ORDER — BISACODYL 5 MG/1
5 TABLET, DELAYED RELEASE ORAL DAILY PRN
Status: DISCONTINUED | OUTPATIENT
Start: 2023-07-17 | End: 2023-07-20 | Stop reason: HOSPADM

## 2023-07-17 RX ORDER — METOPROLOL SUCCINATE 50 MG/1
25 TABLET, EXTENDED RELEASE ORAL EVERY EVENING
COMMUNITY

## 2023-07-17 RX ORDER — ONDANSETRON 2 MG/ML
4 INJECTION INTRAMUSCULAR; INTRAVENOUS EVERY 4 HOURS PRN
Status: DISCONTINUED | OUTPATIENT
Start: 2023-07-17 | End: 2023-07-20 | Stop reason: HOSPADM

## 2023-07-17 RX ORDER — ACETAMINOPHEN 325 MG/1
650 TABLET ORAL EVERY 4 HOURS PRN
Status: DISCONTINUED | OUTPATIENT
Start: 2023-07-17 | End: 2023-07-20 | Stop reason: HOSPADM

## 2023-07-17 RX ORDER — LOSARTAN POTASSIUM 100 MG/1
100 TABLET ORAL DAILY
COMMUNITY
End: 2023-07-17 | Stop reason: SDUPTHER

## 2023-07-17 RX ORDER — HYDROCODONE BITARTRATE AND ACETAMINOPHEN 5; 325 MG/1; MG/1
1 TABLET ORAL EVERY 4 HOURS PRN
Status: DISCONTINUED | OUTPATIENT
Start: 2023-07-17 | End: 2023-07-20 | Stop reason: HOSPADM

## 2023-07-17 RX ORDER — INSULIN LISPRO 100 [IU]/ML
2-9 INJECTION, SOLUTION INTRAVENOUS; SUBCUTANEOUS
Status: DISCONTINUED | OUTPATIENT
Start: 2023-07-17 | End: 2023-07-20 | Stop reason: HOSPADM

## 2023-07-17 RX ORDER — METOPROLOL SUCCINATE 50 MG/1
50 TABLET, EXTENDED RELEASE ORAL EVERY 12 HOURS SCHEDULED
Status: DISCONTINUED | OUTPATIENT
Start: 2023-07-17 | End: 2023-07-20 | Stop reason: HOSPADM

## 2023-07-17 RX ORDER — LOSARTAN POTASSIUM 25 MG/1
100 TABLET ORAL DAILY
Status: DISCONTINUED | OUTPATIENT
Start: 2023-07-18 | End: 2023-07-20 | Stop reason: HOSPADM

## 2023-07-17 RX ORDER — TEMAZEPAM 15 MG/1
15 CAPSULE ORAL NIGHTLY PRN
Status: DISCONTINUED | OUTPATIENT
Start: 2023-07-17 | End: 2023-07-20 | Stop reason: HOSPADM

## 2023-07-17 RX ORDER — SODIUM CHLORIDE 0.9 % (FLUSH) 0.9 %
10 SYRINGE (ML) INJECTION EVERY 12 HOURS SCHEDULED
Status: DISCONTINUED | OUTPATIENT
Start: 2023-07-17 | End: 2023-07-20 | Stop reason: HOSPADM

## 2023-07-17 RX ORDER — MORPHINE SULFATE 2 MG/ML
2 INJECTION, SOLUTION INTRAMUSCULAR; INTRAVENOUS
Status: DISCONTINUED | OUTPATIENT
Start: 2023-07-17 | End: 2023-07-20 | Stop reason: HOSPADM

## 2023-07-17 RX ORDER — NICOTINE POLACRILEX 4 MG
15 LOZENGE BUCCAL
Status: DISCONTINUED | OUTPATIENT
Start: 2023-07-17 | End: 2023-07-20 | Stop reason: HOSPADM

## 2023-07-17 RX ORDER — BISACODYL 10 MG
10 SUPPOSITORY, RECTAL RECTAL DAILY PRN
Status: DISCONTINUED | OUTPATIENT
Start: 2023-07-17 | End: 2023-07-20 | Stop reason: HOSPADM

## 2023-07-17 RX ORDER — FAMOTIDINE 20 MG/1
40 TABLET, FILM COATED ORAL DAILY
Status: DISCONTINUED | OUTPATIENT
Start: 2023-07-17 | End: 2023-07-20 | Stop reason: HOSPADM

## 2023-07-17 RX ADMIN — FAMOTIDINE 40 MG: 20 TABLET ORAL at 21:42

## 2023-07-17 RX ADMIN — EMPAGLIFLOZIN 10 MG: 10 TABLET, FILM COATED ORAL at 21:42

## 2023-07-17 RX ADMIN — VANCOMYCIN HYDROCHLORIDE 3000 MG: 5 INJECTION, POWDER, LYOPHILIZED, FOR SOLUTION INTRAVENOUS at 16:26

## 2023-07-17 RX ADMIN — Medication 10 ML: at 21:43

## 2023-07-17 RX ADMIN — LINAGLIPTIN 5 MG: 5 TABLET, FILM COATED ORAL at 21:42

## 2023-07-17 RX ADMIN — ENOXAPARIN SODIUM 40 MG: 100 INJECTION SUBCUTANEOUS at 21:42

## 2023-07-17 RX ADMIN — METOPROLOL SUCCINATE 50 MG: 50 TABLET, EXTENDED RELEASE ORAL at 21:42

## 2023-07-17 RX ADMIN — ATORVASTATIN CALCIUM 20 MG: 20 TABLET, FILM COATED ORAL at 21:42

## 2023-07-17 NOTE — H&P
[facility]   HISTORY AND PHYSICAL    Patient Name: Grayson Rosas Jr.  : 1970  MRN: [id number]  Primary Care Physician:  Everton Chaney MD  Date of admission: 2023    Subjective   Subjective     Chief Complaint:   Toe infection      HPI:    Grayson Rosas Jr. is a 53 y.o. male well-known to me with known history of diabetes, obesity, hypertension, seen in the office a week ago for infection of the toe.  Patient was given oral antibiotics Augmentin and MRI ordered.  MRI could not be done immediately as there was no openings available.  Patient refused to be admitted to hospital and wanted to try oral medications.  He was sent to podiatrist.  Patient had suspected osteomyelitis.    His symptoms did not improve and he saw podiatrist and recommended surgical intervention, patient was sent to ER for admission.  Through ER MRI was performed which showed acute osteomyelitis on the right second toe.        Review of Systems    No fever chills  Wound on the toe  Redness of the toe and foot  Blood sugar slightly high    Personal History     Past Medical History:   Diagnosis Date    Amputation of toe of left foot     Amputation of toe of right foot     Diabetes mellitus     History of skin graft     Hx of eye surgery        History reviewed. No pertinent surgical history.    Family History: family history includes Colon cancer (age of onset: 77) in his mother. Otherwise pertinent FHx was reviewed and not pertinent to current issue.    Social History:  reports that he has never smoked. He does not have any smokeless tobacco history on file. He reports that he does not currently use alcohol. He reports that he does not use drugs.    Home Medications:  BASAGLAR KWIKPEN, Insulin Lispro (1 Unit Dial), atorvastatin, empagliflozin, hydroCHLOROthiazide, levothyroxine, linaclotide, losartan, and metoprolol succinate XL      Allergies:  No Known Allergies    Objective   Objective     Vitals:   Temp:  [98.1 °F (36.7 °C)]  98.1 °F (36.7 °C)  Heart Rate:  [60-76] 64  Resp:  [18] 18  BP: (116-151)/(51-82) 116/62    Physical Exam    Middle-aged morbidly obese male not in acute distress.  No pallor.  Heart regular.  Lungs clear.  Abdomen soft nontender.  Morbidly obese  Extremities trace of edema  Right lower extremity second toe with infected proximal and distal phalanxes  Open ulcer at the tip of the toe and base of the toe  Peripheral pulses decreased        I have personally reviewed the results from the time of this admission to 7/17/2023 17:18 EDT and agree with these findings:  [x]  Laboratory  []  Microbiology  [x]  Radiology  []  EKG/Telemetry   []  Cardiology/Vascular   []  Pathology  []  Old records  []  Other:    CBC:    WBC   Date Value Ref Range Status   07/17/2023 10.67 3.40 - 10.80 10*3/mm3 Final     RBC   Date Value Ref Range Status   07/17/2023 4.61 4.14 - 5.80 10*6/mm3 Final     Hemoglobin   Date Value Ref Range Status   07/17/2023 13.6 13.0 - 17.7 g/dL Final     Hematocrit   Date Value Ref Range Status   07/17/2023 41.1 37.5 - 51.0 % Final     MCV   Date Value Ref Range Status   07/17/2023 89.2 79.0 - 97.0 fL Final     MCH   Date Value Ref Range Status   07/17/2023 29.5 26.6 - 33.0 pg Final     MCHC   Date Value Ref Range Status   07/17/2023 33.1 31.5 - 35.7 g/dL Final     RDW   Date Value Ref Range Status   07/17/2023 13.9 12.3 - 15.4 % Final     RDW-SD   Date Value Ref Range Status   07/17/2023 44.9 37.0 - 54.0 fl Final     MPV   Date Value Ref Range Status   07/17/2023 9.1 6.0 - 12.0 fL Final     Platelets   Date Value Ref Range Status   07/17/2023 371 140 - 450 10*3/mm3 Final     Neutrophil %   Date Value Ref Range Status   07/17/2023 75.2 42.7 - 76.0 % Final     Lymphocyte %   Date Value Ref Range Status   07/17/2023 13.0 (L) 19.6 - 45.3 % Final     Monocyte %   Date Value Ref Range Status   07/17/2023 7.2 5.0 - 12.0 % Final     Eosinophil %   Date Value Ref Range Status   07/17/2023 3.0 0.3 - 6.2 % Final      Basophil %   Date Value Ref Range Status   07/17/2023 0.7 0.0 - 1.5 % Final     Immature Grans %   Date Value Ref Range Status   07/17/2023 0.9 (H) 0.0 - 0.5 % Final     Neutrophils, Absolute   Date Value Ref Range Status   07/17/2023 8.02 (H) 1.70 - 7.00 10*3/mm3 Final     Lymphocytes, Absolute   Date Value Ref Range Status   07/17/2023 1.39 0.70 - 3.10 10*3/mm3 Final     Monocytes, Absolute   Date Value Ref Range Status   07/17/2023 0.77 0.10 - 0.90 10*3/mm3 Final     Eosinophils, Absolute   Date Value Ref Range Status   07/17/2023 0.32 0.00 - 0.40 10*3/mm3 Final     Basophils, Absolute   Date Value Ref Range Status   07/17/2023 0.07 0.00 - 0.20 10*3/mm3 Final     Immature Grans, Absolute   Date Value Ref Range Status   07/17/2023 0.10 (H) 0.00 - 0.05 10*3/mm3 Final     nRBC   Date Value Ref Range Status   07/17/2023 0.0 0.0 - 0.2 /100 WBC Final        BMP:    Lab Results   Component Value Date    GLUCOSE 175 (H) 07/17/2023    BUN 20 07/17/2023    CREATININE 1.27 07/17/2023    BCR 15.7 07/17/2023    K 4.5 07/17/2023    CO2 24.8 07/17/2023    CALCIUM 9.3 07/17/2023    ALBUMIN 4.2 07/17/2023    LABIL2 1.3 (L) 01/04/2021    AST 14 07/17/2023    ALT 18 07/17/2023        MRI Foot Right Without Contrast    Result Date: 7/17/2023    1. There is a wound or ulceration of the distal tip of the 2nd digit.  Underlying soft tissue edema is seen throughout the digit indicating underlying soft tissue cellulitis.  There is no definitive abscess. 2. Findings consistent with osteomyelitis involving the distal phalanx of the 2nd digit.  The proximal and middle phalanges appear to be spared.     VIVIENNE LOPEZ MD       Electronically Signed and Approved By: VIVIENNE LOPEZ MD on 7/17/2023 at 15:37                     Assessment & Plan   Assessment / Plan       Current Diagnosis:  Active Hospital Problems    Diagnosis     **Pyogenic inflammation of bone     Acute osteomyelitis of toe of right foot     Uncontrolled diabetes mellitus  with hyperglycemia     Essential hypertension      Plan:   Admit to hospital  Patient with osteomyelitis   IV vancomycin  Consult podiatrist.  Check arterial evaluation.  Monitor sugars and sliding scale  Further management will based on clinical course and lab results and consultant input    DVT prophylaxis:      Patient on Lovenox    GI Prophylaxis:       Pepcid    CODE STATUS:    Level Of Support Discussed With: Patient  Code Status (Patient has no pulse and is not breathing): CPR (Attempt to Resuscitate)  Medical Interventions (Patient has pulse or is breathing): Full Support    Admission Status:  I believe this patient meets inpatient status.             I have dictated this note utilizing Dragon Dictation.             Please note that portions of this note were completed with a voice recognition program.             Part of this note may be an electronic transcription/translation of spoken language to printed text         using the Dragon Dictation System.       Electronically signed by Everton Chaney MD, 07/17/23, 5:15 PM EDT.    Total time spent with in evaluation and management:

## 2023-07-17 NOTE — ED PROVIDER NOTES
Emergency Department Encounter    Date seen: 7/18/2023  Time: 3:12 PM EDT    Room number: 4007/1    Chief Complaint: toe wound    HPI    History of Present Illness:  Patient is a 53 y.o. year old male who presents to the emergency department for evaluation of toe wound.  Patient has a significant and extensive wound to his right second toe.  Edges do appear to be necrotic.  There is associated cellulitis.  Patient is diabetic however he has no pain at this time due to neuropathy.  He has had no nausea, vomiting, diarrhea, fever, or chills.  It is noted that his fourth toe on that foot has been amputated previously.  Pain is 0.    Independent Historian/Clinical History and Information was obtained by:   Patient, Family, and podiatrist    History is limited by:  Patient was poor historian and events leading to him being referred to the ER were not obvious.  Please see ED course note      PCP: Everton Chaney MD        Past Medical History:     No Known Allergies  Past Medical History:   Diagnosis Date    Amputation of toe of left foot     Amputation of toe of right foot     Diabetes mellitus     History of skin graft     Hx of eye surgery      History reviewed. No pertinent surgical history.  Family History   Problem Relation Age of Onset    Colon cancer Mother 77       Home Medications:  Prior to Admission medications    Medication Sig Start Date End Date Taking? Authorizing Provider   atorvastatin (LIPITOR) 20 MG tablet Take 20 mg by mouth every night at bedtime. 9/28/21   Provider, Historical, MD   Farxiga 5 MG tablet tablet Take 1 tablet by mouth Daily. 10/5/21   Markel Solomon MD   HumaLOG KwikPen 100 UNIT/ML solution pen-injector INJECT 25 UNITS SUBCUTANEOUSLY THREE TIMES A DAY BEFORE MEALS 9/21/21   Markel Solomon MD   hydroCHLOROthiazide (HYDRODIURIL) 25 MG tablet Take 25 mg by mouth Daily. 9/28/21   Markel Solomon MD   Jardiance 10 MG tablet tablet TAKE ONE TABLET BY MOUTH ONCE EVERY MORNING  9/28/21   Markel Solomon MD   levothyroxine (SYNTHROID, LEVOTHROID) 25 MCG tablet 1 TAB(S) ONCE A DAY ORALLY 90 DAYS 7/26/21   Markel Solomon MD   Linzess 145 MCG capsule capsule Take 145 mcg by mouth Daily. 9/28/21   Markel Solomon MD   losartan (COZAAR) 100 MG tablet Take 100 mg by mouth Daily. 9/28/21   Markel Solomon MD   metoprolol succinate XL (TOPROL-XL) 50 MG 24 hr tablet TAKE ONE TABLET BY MOUTH ONCE EVERY MORNING AND TAKE 1/2 TABLET ONCE EVERY EVENING 9/28/21   Markel Solomon MD   polyethylene glycol (GoLYTELY) 236 g solution Starting at noon on day prior to procedure, drink 8 ounces every 30 minutes until all gone or stools are clear. May add flavor packet. 10/19/21   Vilma Chappell APRN   Tradjenta 5 MG tablet tablet Take 1 tablet by mouth Daily. 9/28/21   Markel Solomon MD        Social History:   Social History     Tobacco Use    Smoking status: Never   Vaping Use    Vaping Use: Former   Substance Use Topics    Alcohol use: Not Currently    Drug use: Never       All labs were reviewed and interpreted by me.  Ultrasound impression was interpreted by me.   MRI impression was interpreted by me.       Review of Systems:  Review of Systems   Constitutional:  Negative for chills and fever.   HENT:  Negative for congestion, ear pain and sore throat.    Eyes:  Negative for pain.   Respiratory:  Negative for cough, chest tightness and shortness of breath.    Cardiovascular:  Negative for chest pain.   Gastrointestinal:  Negative for abdominal pain, diarrhea, nausea and vomiting.   Genitourinary:  Negative for flank pain and hematuria.   Musculoskeletal:  Negative for joint swelling.   Skin:  Positive for wound. Negative for pallor.   Neurological:  Negative for seizures and headaches.   All other systems reviewed and are negative.     Physical Exam:  /79 (BP Location: Right arm, Patient Position: Sitting)   Pulse 77   Temp 97.5 °F (36.4 °C) (Oral)    "Resp 18   Ht 177.8 cm (70\")   Wt (!) 145 kg (318 lb 9 oz)   SpO2 98%   BMI 45.71 kg/m²     Physical Exam  Vitals and nursing note reviewed.   Constitutional:       General: He is not in acute distress.     Appearance: Normal appearance. He is obese. He is not toxic-appearing.   HENT:      Head: Normocephalic and atraumatic.      Mouth/Throat:      Mouth: Mucous membranes are moist.   Eyes:      General: No scleral icterus.  Cardiovascular:      Rate and Rhythm: Normal rate and regular rhythm.      Pulses: Normal pulses.      Heart sounds: Normal heart sounds.   Pulmonary:      Effort: Pulmonary effort is normal. No respiratory distress.      Breath sounds: Normal breath sounds. No stridor. No wheezing.   Chest:      Chest wall: No tenderness.   Abdominal:      General: Abdomen is flat. There is no distension.      Palpations: Abdomen is soft.      Tenderness: There is no abdominal tenderness.   Musculoskeletal:         General: No swelling or tenderness. Normal range of motion.      Cervical back: Normal range of motion and neck supple.   Skin:     General: Skin is warm and dry.      Capillary Refill: Capillary refill takes more than 3 seconds.      Comments: Necrotic wound to right second toe wound does appear to be slightly discolored however this is thought to be due to the iodine wash that was completed at his podiatrist office prior to arriving here in the emergency department.   Neurological:      Mental Status: He is alert and oriented to person, place, and time. Mental status is at baseline.   Psychiatric:         Mood and Affect: Mood normal.         Behavior: Behavior normal.         Thought Content: Thought content normal.         Judgment: Judgment normal.                Procedures:  Procedures      Medical Decision Making:      Comorbidities that affect care:    Previous toe amputations bilateral, skin graft, Diabetes, Hypertension, Obesity    External Notes reviewed:          The following orders " were placed and all results were independently analyzed by me:  Orders Placed This Encounter   Procedures    Blood Culture - Blood,    Blood Culture - Blood,    MRSA Screen, PCR (Inpatient) - Swab, Nares    MRI Foot Right Without Contrast    [place] Draw    Comprehensive Metabolic Panel    Lactic Acid, Plasma    CBC Auto Differential    Vancomycin, Random    Vancomycin, Random    Basic Metabolic Panel    Hemoglobin A1c    Prealbumin    C-reactive Protein    Sedimentation Rate    Diet: Cardiac Diets, Diabetic Diets; Healthy Heart (2-3 Na+); Consistent Carbohydrate; Texture: Regular Texture (IDDSI 7); Fluid Consistency: Thin (IDDSI 0)    NPO Diet NPO Type: Strict NPO, Sips with Meds    Vital Signs    Notify Provider (With Default Parameters)    Up With Assistance    Intake & Output    Weigh Patient    Oral Care    Saline Lock & Maintain IV Access    Place Sequential Compression Device    Maintain Sequential Compression Device    Obtain Informed Consent    Wound Care    Code Status and Medical Interventions:    IP General Consult (Use specialty-specific consult if known)    Inpatient Case Management  Consult    Inpatient Podiatry Consult    POC Glucose 4x Daily AC & at Bedtime    POC Glucose Once    POC Glucose Once    POC Glucose Once    Consult to Wound / Ostomy Care    Insert peripheral IV    Insert Peripheral IV    Inpatient Admission    CBC & Differential    Green Top (Gel)    Lavender Top    Gold Top - SST    Light Blue Top       Medications Given in the Emergency Department:  Medications   sodium chloride 0.9 % flush 10 mL (has no administration in time range)   empagliflozin (JARDIANCE) tablet 10 mg (10 mg Oral Given 7/18/23 0856)   linagliptin (TRADJENTA) tablet 5 mg (5 mg Oral Given 7/18/23 0855)   atorvastatin (LIPITOR) tablet 20 mg (20 mg Oral Given 7/17/23 2142)   levothyroxine (SYNTHROID, LEVOTHROID) tablet 25 mcg (25 mcg Oral Given 7/18/23 0556)   metoprolol succinate XL (TOPROL-XL) 24 hr  tablet 50 mg (50 mg Oral Given 7/18/23 0856)   losartan (COZAAR) tablet 100 mg (100 mg Oral Given 7/18/23 0856)   sodium chloride 0.9 % flush 10 mL (10 mL Intravenous Given 7/18/23 0857)   sodium chloride 0.9 % flush 10 mL (has no administration in time range)   sodium chloride 0.9 % infusion 40 mL (has no administration in time range)   acetaminophen (TYLENOL) tablet 650 mg (has no administration in time range)   HYDROcodone-acetaminophen (NORCO) 5-325 MG per tablet 1 tablet (has no administration in time range)   morphine injection 2 mg (has no administration in time range)     And   naloxone (NARCAN) injection 0.4 mg (has no administration in time range)   HYDROmorphone (DILAUDID) injection 0.5 mg (has no administration in time range)     And   naloxone (NARCAN) injection 0.4 mg (has no administration in time range)   aluminum-magnesium hydroxide-simethicone (MAALOX MAX) 400-400-40 MG/5ML suspension 15 mL (has no administration in time range)   famotidine (PEPCID) tablet 40 mg (40 mg Oral Given 7/18/23 0856)   ALPRAZolam (XANAX) tablet 0.25 mg (has no administration in time range)   sennosides-docusate (PERICOLACE) 8.6-50 MG per tablet 2 tablet (2 tablets Oral Not Given 7/18/23 0809)     And   polyethylene glycol (MIRALAX) packet 17 g (has no administration in time range)     And   bisacodyl (DULCOLAX) EC tablet 5 mg (has no administration in time range)     And   bisacodyl (DULCOLAX) suppository 10 mg (has no administration in time range)   ondansetron (ZOFRAN) injection 4 mg (has no administration in time range)   temazepam (RESTORIL) capsule 15 mg (has no administration in time range)   Pharmacy to Dose enoxaparin (LOVENOX) (has no administration in time range)   dextrose (GLUTOSE) oral gel 15 g (has no administration in time range)   dextrose (D50W) (25 g/50 mL) IV injection 25 g (has no administration in time range)   glucagon (GLUCAGEN) injection 1 mg (has no administration in time range)   Insulin Lispro  (humaLOG) injection 2-9 Units (6 Units Subcutaneous Given 7/18/23 1802)   Enoxaparin Sodium (LOVENOX) syringe 40 mg (40 mg Subcutaneous Given 7/18/23 0856)   Pharmacy to dose vancomycin (has no administration in time range)   vancomycin 1000 mg/250 mL 0.9% NS IVPB ([facility]) (1,000 mg Intravenous New Bag 7/18/23 0948)   vancomycin 3000 mg/500 mL 0.9% NS IVPB ([facility]) (3,000 mg Intravenous New Bag 7/17/23 1626)        ED Course:    ED Course as of 07/18/23 2010 Mon Jul 17, 2023   1530 There appears to be some confusion about patient's arrival here in the emergency department and disposition from this point on.  Patient advises that his podiatrist, Dr. Fowler, with the [facility] advised patient to come to the emergency department so that he can get an MRI and be admitted for surgery.  This is per patient stating.  However patient goes on to advise that  does not have admitting privileges here so she advised him to go to the emergency department.  However Dr. Hickey, another podiatrist within that facility, does have admitting privileges.  And patient states he is supposed to be calling to give report on patient.  At this time I have not spoke with either physicians but it does appear that Dr. Moser not has placed orders in patient's ER medical record. [MS]   1545 I called to speak with  who gave verbal request for specific imaging [MS]   1632 Due to the confusion of plan of care for this patient and final disposition I called Dr. Hickey for clarification.  He advised that patient needs to be admitted, he needs an MRI, that he is already on the OR schedule to have amputation on Wednesday.    I then called his PCP Dr. Chaney who is also an admitting physician here at this hospital and admitted patient for surgical procedure to be completed for toe amputation [MS]      ED Course User Index  [MS] Gala Staley, ARUN       Labs:    Lab Results (last 24 hours)       Procedure Component  Value Units Date/Time    Vancomycin, Random [[id number]]  (Normal) Collected: 07/18/23 0503    Specimen: Blood Updated: 07/18/23 0614     Vancomycin Random 17.90 mcg/mL     Narrative:      Therapeutic Ranges for Vancomycin    Vancomycin Random   5.0-40.0 mcg/mL  Vancomycin Trough   5.0-20.0 mcg/mL  Vancomycin Peak     20.0-40.0 mcg/mL    Hemoglobin A1c [[id number]]  (Abnormal) Collected: 07/18/23 0503    Specimen: Blood Updated: 07/18/23 1747     Hemoglobin A1C 8.60 %     Narrative:      Hemoglobin A1C Ranges:    Increased Risk for Diabetes  5.7% to 6.4%  Diabetes                     >= 6.5%  Diabetic Goal                < 7.0%    POC Glucose Once [[id number]]  (Abnormal) Collected: 07/18/23 0801    Specimen: Blood Updated: 07/18/23 0803     Glucose 190 mg/dL      Comment: Serial Number: [id number]:  [id number]       MRSA Screen, PCR (Inpatient) - Swab, Nares [[id number]]  (Abnormal) Collected: 07/18/23 0948    Specimen: Swab from Nares Updated: 07/18/23 1129     MRSA PCR MRSA Detected    Narrative:      The negative predictive value of this diagnostic test is high and should only be used to consider de-escalating anti-MRSA therapy. A positive result may indicate colonization with MRSA and must be correlated clinically.    Prealbumin [[id number]]  (Abnormal) Collected: 07/18/23 1052    Specimen: Blood Updated: 07/18/23 1645     Prealbumin 19.0 mg/dL     Sedimentation Rate [[id number]]  (Abnormal) Collected: 07/18/23 1052    Specimen: Blood Updated: 07/18/23 1114     Sed Rate 62 mm/hr     POC Glucose Once [[id number]]  (Abnormal) Collected: 07/18/23 1126    Specimen: Blood Updated: 07/18/23 1127     Glucose 217 mg/dL      Comment: Serial Number: [id number]:  [id number]       POC Glucose Once [[id number]]  (Abnormal) Collected: 07/18/23 1639    Specimen: Blood Updated: 07/18/23 1641     Glucose 281 mg/dL      Comment: Serial Number: [id number]:  [id number]                Imaging:    No Radiology Exams  Resulted Within Past 24 Hours      Differential Diagnosis and Discussion:    Extremity Pain: Differential diagnosis includes but is not limited to soft tissue sprain, tendonitis, tendon injury, dislocation, fracture, deep vein thrombosis, arterial insufficiency, osteoarthritis, bursitis, and ligamentous damage.  Metabolic: Differential diagnosis includes but is not limited to hypertension, hyperglycemia, hyperkalemia, hypocalcemia, metabolic acidosis, hypokalemia, hypoglycemia, malnutrition, hypothyroidism, hyperthyroidism, and adrenal insufficiency.         Patient Care Considerations:    CT EXTREMITY: I considered ordering an extremity CT, however podiatrist insistent on MRI being completed as well as DIONICIO instead of CT.        Consultants/Shared Management Plan:    I discussed this patient with 2 referring podiatrist 1 who is an attending and 1 who is a fellow with admitting privileges.  I then discussed the patient with Dr. Chaney who agreed to admit him to the hospital so that Dr. Hickey can perform surgical procedure/amputation tomorrow or Wednesday.    Social Determinants of Health:    Patient is independent, reliable, and has access to care.       Disposition and Care Coordination:    Admit:   Through independent evaluation of the patient's history, physical, and imperical data, the patient meets criteria for observation/admission to the hospital.        MDM  Number of Diagnoses or Management Options  Osteomyelitis of right foot, unspecified type: new and requires workup     Amount and/or Complexity of Data Reviewed  Clinical lab tests: ordered and reviewed  Tests in the radiology section of CPT®: ordered and reviewed  Review and summarize past medical records: yes (I have personally reviewed patient's previous medical encounters.)  Discuss the patient with other providers: yes (I consulted with podiatrist, Dr. Fowler and podiatrist Dr. Hickey.  Patient's PCP/hospitalist, Dr. Chaney was then consulted and agreed  to admit patient to the hospital for surgical procedure to be completed by Dr. Hickey)    Risk of Complications, Morbidity, and/or Mortality  Presenting problems: high  Diagnostic procedures: moderate  Management options: high    Patient Progress  Patient progress: stable       Final diagnoses:   Osteomyelitis of right foot, unspecified type        ED Disposition       ED Disposition   Decision to Admit    Condition   --    Comment   Level of Care: Med/Surg [1]   Diagnosis: Acute osteomyelitis of toe of right foot [[id number]]   Admitting Physician: LETITIA RIVERA [[id number]]   Attending Physician: LETITIA RIVERA [[id number]]   Isolate for COVID?: No [0]   Certification: I Certify That Inpatient Hospital Services Are Medically Necessary For Greater Than 2 Midnights                 This medical record created using voice recognition software.                       Gala Staley APRN  07/18/23 2009       Gala Staley APRN  07/18/23 2010

## 2023-07-18 LAB
BH CV LOWER ARTERIAL LEFT ABI RATIO: 1.45
BH CV LOWER ARTERIAL LEFT DORSALIS PEDIS SYS MAX: 188
BH CV LOWER ARTERIAL LEFT GREAT TOE SYS MAX: 142
BH CV LOWER ARTERIAL LEFT POST TIBIAL SYS MAX: 239
BH CV LOWER ARTERIAL LEFT TBI RATIO: 0.86
BH CV LOWER ARTERIAL RIGHT ABI RATIO: 1.51
BH CV LOWER ARTERIAL RIGHT DORSALIS PEDIS SYS MAX: 249
BH CV LOWER ARTERIAL RIGHT GREAT TOE SYS MAX: 138
BH CV LOWER ARTERIAL RIGHT POST TIBIAL SYS MAX: 242
BH CV LOWER ARTERIAL RIGHT TBI RATIO: 0.84
CRP SERPL-MCNC: 2.61 MG/DL (ref 0–0.5)
ERYTHROCYTE [SEDIMENTATION RATE] IN BLOOD: 62 MM/HR (ref 0–20)
GLUCOSE BLDC GLUCOMTR-MCNC: 189 MG/DL (ref 70–99)
GLUCOSE BLDC GLUCOMTR-MCNC: 190 MG/DL (ref 70–99)
GLUCOSE BLDC GLUCOMTR-MCNC: 217 MG/DL (ref 70–99)
GLUCOSE BLDC GLUCOMTR-MCNC: 281 MG/DL (ref 70–99)
HBA1C MFR BLD: 8.6 % (ref 4.8–5.6)
MRSA DNA SPEC QL NAA+PROBE: ABNORMAL
PREALB SERPL-MCNC: 19 MG/DL (ref 20–40)
UPPER ARTERIAL RIGHT ARM BRACHIAL SYS MAX: 165 MMHG
VANCOMYCIN SERPL-MCNC: 17.9 MCG/ML (ref 5–40)

## 2023-07-18 PROCEDURE — 87641 MR-STAPH DNA AMP PROBE: CPT | Performed by: INTERNAL MEDICINE

## 2023-07-18 PROCEDURE — 80202 ASSAY OF VANCOMYCIN: CPT | Performed by: INTERNAL MEDICINE

## 2023-07-18 PROCEDURE — 82948 REAGENT STRIP/BLOOD GLUCOSE: CPT

## 2023-07-18 PROCEDURE — 63710000001 INSULIN LISPRO (HUMAN) PER 5 UNITS: Performed by: INTERNAL MEDICINE

## 2023-07-18 PROCEDURE — 85652 RBC SED RATE AUTOMATED: CPT | Performed by: PODIATRIST

## 2023-07-18 PROCEDURE — 25010000002 ENOXAPARIN PER 10 MG: Performed by: INTERNAL MEDICINE

## 2023-07-18 PROCEDURE — 83036 HEMOGLOBIN GLYCOSYLATED A1C: CPT | Performed by: PODIATRIST

## 2023-07-18 PROCEDURE — 25010000002 VANCOMYCIN 5 G RECONSTITUTED SOLUTION: Performed by: INTERNAL MEDICINE

## 2023-07-18 PROCEDURE — 84134 ASSAY OF PREALBUMIN: CPT | Performed by: PODIATRIST

## 2023-07-18 RX ADMIN — VANCOMYCIN HYDROCHLORIDE 1000 MG: 5 INJECTION, POWDER, LYOPHILIZED, FOR SOLUTION INTRAVENOUS at 21:22

## 2023-07-18 RX ADMIN — VANCOMYCIN HYDROCHLORIDE 1000 MG: 5 INJECTION, POWDER, LYOPHILIZED, FOR SOLUTION INTRAVENOUS at 09:48

## 2023-07-18 RX ADMIN — INSULIN LISPRO 4 UNITS: 100 INJECTION, SOLUTION INTRAVENOUS; SUBCUTANEOUS at 12:31

## 2023-07-18 RX ADMIN — METOPROLOL SUCCINATE 50 MG: 50 TABLET, EXTENDED RELEASE ORAL at 08:56

## 2023-07-18 RX ADMIN — EMPAGLIFLOZIN 10 MG: 10 TABLET, FILM COATED ORAL at 08:56

## 2023-07-18 RX ADMIN — LOSARTAN POTASSIUM 100 MG: 25 TABLET, FILM COATED ORAL at 08:56

## 2023-07-18 RX ADMIN — Medication 10 ML: at 08:57

## 2023-07-18 RX ADMIN — FAMOTIDINE 40 MG: 20 TABLET ORAL at 08:56

## 2023-07-18 RX ADMIN — TEMAZEPAM 15 MG: 15 CAPSULE ORAL at 21:21

## 2023-07-18 RX ADMIN — LINAGLIPTIN 5 MG: 5 TABLET, FILM COATED ORAL at 08:55

## 2023-07-18 RX ADMIN — INSULIN LISPRO 2 UNITS: 100 INJECTION, SOLUTION INTRAVENOUS; SUBCUTANEOUS at 08:55

## 2023-07-18 RX ADMIN — ATORVASTATIN CALCIUM 20 MG: 20 TABLET, FILM COATED ORAL at 21:21

## 2023-07-18 RX ADMIN — INSULIN LISPRO 6 UNITS: 100 INJECTION, SOLUTION INTRAVENOUS; SUBCUTANEOUS at 18:02

## 2023-07-18 RX ADMIN — LEVOTHYROXINE SODIUM 25 MCG: 25 TABLET ORAL at 05:56

## 2023-07-18 RX ADMIN — METOPROLOL SUCCINATE 50 MG: 50 TABLET, EXTENDED RELEASE ORAL at 21:21

## 2023-07-18 RX ADMIN — Medication 10 ML: at 21:22

## 2023-07-18 RX ADMIN — ENOXAPARIN SODIUM 40 MG: 100 INJECTION SUBCUTANEOUS at 08:56

## 2023-07-18 NOTE — CONSULTS
[facility]    Referring Provider: No ref. provider found    Reason for Consultation: Osteomyelitis right foot    Patient Care Team:  Everton Chaney MD as PCP - General (Internal Medicine)    Chief complaint: Chronic wound with bone infection second digit    Subjective .     History of present illness:  Grayson Rosas Jr. is a 53 y.o. male who presents with full-thickness ulceration distal tuft second digit right foot.  Patient is established with podiatrist Dr. Chaney and Dr. Fowler.  Patient relates that he first noticed the issue July 3 and thought it was just loose skin and while picking at it created a ulcer.  Patient was seen by his podiatrist for local wound care.  Patient was last seen yesterday and his podiatrist was concerned with localized edema, cellulitis and exposure of bone that he was sent from the clinic to be admitted to the hospital for further work-up and evaluation.  Patient denies nausea, vomiting, fever, chills, shortness of breath, or other constitutional symptoms.  Patient has no other pedal complaint during today's examination.        Review of Systems: A 10 point review of system is unremarkable with exceptions of the chief complaint.  Patient specifically denies nausea, vomiting, fever, chills, shortness of breath, chest pain, abdominal pain, constipation, or difficulty urination.     History  Past Medical History:   Diagnosis Date    Amputation of toe of left foot     Amputation of toe of right foot     Diabetes mellitus     History of skin graft     Hx of eye surgery    , History reviewed. No pertinent surgical history.,   Family History   Problem Relation Age of Onset    Colon cancer Mother 77   ,   Social History     Tobacco Use    Smoking status: Never   Vaping Use    Vaping Use: Former   Substance Use Topics    Alcohol use: Not Currently    Drug use: Never   ,   Medications Prior to Admission   Medication Sig Dispense Refill Last Dose    atorvastatin  (LIPITOR) 20 MG tablet Take 1 tablet by mouth Every Night.   7/16/2023    HumaLOG KwikPen 100 UNIT/ML solution pen-injector Inject 25 Units under the skin into the appropriate area as directed 3 (Three) Times a Day Before Meals. PER SLIDING SCALE   7/16/2023    hydroCHLOROthiazide (HYDRODIURIL) 25 MG tablet Take 1 tablet by mouth Daily.   7/16/2023    Insulin Glargine (BASAGLAR KWIKPEN) 100 UNIT/ML injection pen Inject 35 Units under the skin into the appropriate area as directed 2 (Two) Times a Day.   7/16/2023    Jardiance 10 MG tablet tablet Take 1 tablet by mouth Daily.   7/16/2023    levothyroxine (SYNTHROID, LEVOTHROID) 25 MCG tablet Take 1 tablet by mouth Every Morning.   7/16/2023    Linzess 145 MCG capsule capsule Take 1 capsule by mouth Daily.   7/16/2023    losartan (COZAAR) 100 MG tablet Take 1 tablet by mouth Daily.   7/16/2023    metoprolol succinate XL (TOPROL-XL) 50 MG 24 hr tablet 1 tablet Every Morning.   7/16/2023    metoprolol succinate XL (TOPROL-XL) 50 MG 24 hr tablet Take 25 mg by mouth Every Evening.   7/16/2023   , Scheduled Meds:  atorvastatin, 20 mg, Oral, Nightly  empagliflozin, 10 mg, Oral, Daily  enoxaparin, 40 mg, Subcutaneous, Q12H  famotidine, 40 mg, Oral, Daily  insulin lispro, 2-9 Units, Subcutaneous, 4x Daily AC & at Bedtime  levothyroxine, 25 mcg, Oral, Q AM  linagliptin, 5 mg, Oral, Daily  losartan, 100 mg, Oral, Daily  metoprolol succinate XL, 50 mg, Oral, Q12H  senna-docusate sodium, 2 tablet, Oral, BID  sodium chloride, 10 mL, Intravenous, Q12H  vancomycin, 1,000 mg, Intravenous, Q12H    , PRN Meds:    acetaminophen    ALPRAZolam    aluminum-magnesium hydroxide-simethicone    senna-docusate sodium **AND** polyethylene glycol **AND** bisacodyl **AND** bisacodyl    dextrose    dextrose    glucagon (human recombinant)    HYDROcodone-acetaminophen    HYDROmorphone **AND** naloxone    Morphine **AND** naloxone    ondansetron    Pharmacy to Dose enoxaparin (LOVENOX)    Pharmacy to  dose vancomycin    sodium chloride    sodium chloride    sodium chloride    temazepam and Allergies:  Patient has no known allergies.    Objective     Vital Signs   Temp:  [97.9 °F (36.6 °C)-98.4 °F (36.9 °C)] 98.3 °F (36.8 °C)  Heart Rate:  [60-78] 73  Resp:  [16-18] 16  BP: (116-151)/(51-82) 138/63    Physical Exam:     General Appearance:    Alert, cooperative, in no acute distress                                   Derm: Right foot full-thickness ulceration noted distal tuft second digit.  Wound measures approximately 1.6 x 1.0 x 0.3 cm.  Fibrotic tissue noted with localized edema and slight necrotic tissue.  Wound does probe to distal phalanx.  No tunneling or tracking noted.    Neuro: Protective and gross sensations absent B/L LE.   Vascular: DP and PT pulses palpable B/L LE.  Localized edema along with localized erythema to the second digit.       MSK: History of fourth digit amputation right foot.     Pictures taken 7/18/2023    Patient gave verbal consent of all pictures taken today.    Results Review:   I reviewed the patient's new clinical results.      Assessment & Plan       Pyogenic inflammation of bone    Acute osteomyelitis of toe of right foot    Uncontrolled diabetes mellitus with hyperglycemia    Essential hypertension      Plan:     1.  Osteomyelitis distal phalanx second digit; right foot  2.  Diabetic foot infection with localized cellulitis; right foot        -Patient seen and examined this AM at his reclining chair.  All treatment options and imaging discussed with patient and to his satisfaction.  -MRI reviewed; findings consistent with osteomyelitis at the distal phalanx second digit with no evidence of bone infection at the proximal or middle phalangeal's.  -Nurse communication local wound care right foot.  Patient was consented for right foot second digit toe amputation.  The risks, benefits, and alternatives were discussed and patient verbalized understanding. All questions and concerns  were addressed this morning.  Giving his state of neuropathy and current history of recurrent ulcers on the digits, decision was made to provide better quality of life and prevent risk of further ulcerations.  Patient understood.   -NPO midnight tonight.  -Please hold all anticoagulation morning of surgery.      DISPO: Surgical intervention tentatively tomorrow morning at 715.        I discussed the patients findings and my recommendations with patient, nursing staff, and primary care team    Alonso Hickey DPM  07/18/23  10:23 EDT    Time: More than 50% of time spent in counseling and coordination of care:  Total face-to-face/floor time 30 min.  Time spent in counseling 30 min. Counseling included the following topics: coordination of care, risks, benefits, complications, alternatives to treatment including both non surgical and surgical options.       Part of this note may be an electronic transcription/translation of spoken language to printed text using the Dragon Dictation System.

## 2023-07-18 NOTE — PROGRESS NOTES
[facility]     Progress Note    Patient Name: Grayson Rosas Jr.  : 1970  MRN: [id number]  Primary Care Physician:  Everton Chaney MD  Date of admission: 2023      Subjective   Brief summary.  Patient admitted with osteomyelitis of the toe      HPI:  No fever chills.  Pain in the toe.  No discharge or drainage.  Sugars slightly high    Review of Systems     Fatigue,  No fever chills  No nausea vomiting  Blood sugar stable      Objective     Vitals:   Temp:  [97.5 °F (36.4 °C)-98.4 °F (36.9 °C)] 97.5 °F (36.4 °C)  Heart Rate:  [64-78] 77  Resp:  [16-18] 18  BP: (116-157)/(60-79) 157/79    Physical Exam :   Middle-aged morbidly obese male not in acute distress.  Heart regular.  Lungs clear.  Abdomen soft nontender.  Morbidly obese  Extremities trace of edema  Right lower extremity second toe with infected proximal and distal phalanxes  Open ulcer at the tip of the toe and base of the toe      Result Review:  I have personally reviewed the results from the time of this admission to 2023 16:37 EDT and agree with these findings:  []  Laboratory  []  Microbiology  []  Radiology  []  EKG/Telemetry   []  Cardiology/Vascular   []  Pathology  []  Old records  []  Other:           Assessment / Plan       Active Hospital Problems:  Active Hospital Problems    Diagnosis     **Pyogenic inflammation of bone     Acute osteomyelitis of toe of right foot     Uncontrolled diabetes mellitus with hyperglycemia     Essential hypertension        Plan:   Continue IV vancomycin.  Wound care.  Podiatry consult.  Possible surgery tomorrow.  Monitor sugars  Sliding scale  Check labs in a.m.       DVT prophylaxis:  Medical and mechanical DVT prophylaxis orders are present.    CODE STATUS:   Level Of Support Discussed With: Patient  Code Status (Patient has no pulse and is not breathing): CPR (Attempt to Resuscitate)  Medical Interventions (Patient has pulse or is breathing): Full Support            Electronically signed by  Everton Chaney MD, 07/18/23, 4:37 PM EDT.

## 2023-07-18 NOTE — SIGNIFICANT NOTE
Wound Eval / Progress Noted    LEIA Sen     Patient Name: Grayson Rosas Jr.  : 1970  MRN: [id number]  Today's Date: 2023                 Admit Date: 2023    Visit Dx:    ICD-10-CM ICD-9-CM   1. Osteomyelitis of right foot, unspecified type  M86.9 730.27         Pyogenic inflammation of bone    Acute osteomyelitis of toe of right foot    Uncontrolled diabetes mellitus with hyperglycemia    Essential hypertension        Past Medical History:   Diagnosis Date    Amputation of toe of left foot     Amputation of toe of right foot     Diabetes mellitus     History of skin graft     Hx of eye surgery         History reviewed. No pertinent surgical history.      Physical Assessment:  Wound 23 Right anterior second toe (Active)   Wound Image    23   Dressing Appearance open to air 23   Closure None 23   Base slough;moist;dry;necrotic;exposed structure 23   Yellow (%), Wound Tissue Color 100 23   Periwound intact;dry;indurated 23   Periwound Temperature cool 23   Periwound Skin Turgor firm 23   Edges open 23   Wound Length (cm) 1.5 cm 23   Wound Width (cm) 1.9 cm 23   Wound Depth (cm) 0.5 cm 23   Wound Surface Area (cm^2) 2.85 cm^2 23   Wound Volume (cm^3) 1.425 cm^3 23   Drainage Characteristics/Odor yellow 23   Drainage Amount small 23   Care, Wound cleansed with;irrigated with;sterile normal saline 23   Dressing Care dressing applied;antimicrobial agent applied;gauze, wet-to-moist;gauze;tubular wrap 23   Periwound Care absorptive dressing applied 23        Wound Check / Follow-up:  Patient seen today for a wound consult. Patient has been seen by a podiatrist in an outpatient setting for this wound. Wound base with yellow moist tissue, palpable bone felt. Periwound is thickened and dry  [address] [facility]                                                                                          Hospitalist Progress Note  Tracee Foreman MD  Answering service: [phone] OR 36 from in house phone        Date of Service:  2021  NAME:  Valeria Dietz  :  1937  MRN:  [id number]      Admission Summary:   78 y. o. male who presents with left side weakness and numbness   78year-old male past medical history with CAD, HTN, HLP and CVA. Pt was seen in ER yesterday due episode of left upper lip tingling, which resolved in several hours. She had CT noticed a large chronic subdural hematoma.   ER discussed with Dr. Prince Peng of neurosurgery and arranged pt to be seen in Dr. Lara Heading at 2:30 PM  Today. Today, pt  was at Dr. Tyrone Acevedo office and after pt was seen Nenita Vázquez leaving head at home and suddenly developed acute onset of left-sided weakness.   Said he turned right around went back to Dr. Tyrone Acevedo office which instructed the patient to come here for further evaluation via EMS. Bennei Mcnamara is currently states little bit of a headache on the left side. His left side weakness and numbness symptoms resolved in 30 mins. \"    Interval history / Subjective:   Pt seen and examined  States he is tired of being in hospital for 19 days and wants to get out of here  Patient's family appealing D/C Decision again today per CM    No new seizures      Assessment & Plan:     # Hypotension: noted yesterday while on the toilet, after diarrhea. He was on scheduled Miralax. Hypotension resolved after IV fluids.  -restarted antihypertensives except losartan (BP got too low when losartan was restarted).    - No further hypotension noted-    # Chronic right subdural hematoma:  Intermittent left sided weakness:   # Complex partial Seizure  -MRI redemonstrated Multiseptated  chronic right subdural hematoma  -Holding ASA, plavix - resume once collection resolves (as an outpatient and when cleared by NSG)  -Appreciate neurology following-Keppra/Vimpat and added [id number]  -repeat CT stable  -f/u as an outpt w/ neurosurgery     # Mild bilateral carotid disease     # Hypertension  -holding meds as above     # CAD s/p stents:  -no cp  -home aspirin/plavix was held for reasons above     Code status: Full  DVT prophylaxis: [address] discussed with: Patient/Family  Anticipated Disposition: [facility]/Rehab  Anticipated Discharge: Patient has been medically stable for discharge since 11/5/2021. There are no current active medically issues being addressed. Family is appealing decision of discharge for the 2nd time again. Hospital Problems  Date Reviewed: 10/27/2021          Codes Class Noted POA    Simple partial seizures ([facility] [address] ICD-10-CM: G40.109  ICD-9-CM: 345.50  10/30/2021 Unknown        * (Principal) Subdural hematoma ([facility] [address] ICD-10-CM: F95.9X9S  ICD-9-CM: 432.1  10/22/2021 Unknown                Review of Systems:   Pertinent items are noted in HPI. Vital Signs:    Last 24hrs VS reviewed since prior progress note. Most recent are:  Visit Vitals  BP (!) 143/75   Pulse 76   Temp 97.9 °F (36.6 °C)   Resp 20   Ht 5' 11\" (1.803 m)   Wt 88.7 kg (195 lb 8.8 oz)   SpO2 98%   BMI 27.27 kg/m²       No intake or output data in the 24 hours ending 11/08/21 1236     Physical Examination:     I had a face to face encounter with this patient and independently examined them on 11/8/2021 as outlined below:          Constitutional:  No acute distress, cooperative, pleasant    ENT:  Oral mucosa moist, oropharynx benign. Resp:  CTA bilaterally. CV:  Regular rhythm, normal rate    GI:  Soft, non distended, non tender. normoactive bowel sounds    Musculoskeletal:  No edema, warm    Neurologic:   Ao3, Speech Clear, Tongue midline, Motor 5/5, sensory intact            Data Review:    Review and/or order of clinical lab test  Review and/or order of tests in the radiology section of CPT  Review and/or with crusting present. Patient is scheduled for an amputation if the digit tomorrow; per podiatry recommendation, apply betadine to the wound and secure with a band aid.   Patient denies further wounds at this time.     Impression: chronic wound to the right second toe    Short term goals:  regain skin integrity, dressing changes until surgery    Soledad Parker RN    7/18/2023    10:53 EDT    order of tests in the medicine section of CPT      Labs:     No results for input(s): WBC, HGB, HCT, PLT, HGBEXT, HCTEXT, PLTEXT, HGBEXT, HCTEXT, PLTEXT in the last 72 hours. No results for input(s): NA, K, CL, CO2, BUN, CREA, GLU, CA, MG, PHOS, URICA in the last 72 hours. No results for input(s): ALT, AP, TBIL, TBILI, TP, ALB, GLOB, GGT, AML, LPSE in the last 72 hours. No lab exists for component: SGOT, GPT, AMYP, HLPSE  No results for input(s): INR, PTP, APTT, INREXT, INREXT in the last 72 hours. No results for input(s): FE, TIBC, PSAT, FERR in the last 72 hours. No results found for: FOL, RBCF   No results for input(s): PH, PCO2, PO2 in the last 72 hours. No results for input(s): CPK, CKNDX, TROIQ in the last 72 hours.     No lab exists for component: CPKMB  Lab Results   Component Value Date/Time    Cholesterol, total 169 10/23/2021 08:16 AM    HDL Cholesterol 42 10/23/2021 08:16 AM    LDL, calculated 102.6 (H) 10/23/2021 08:16 AM    Triglyceride 122 10/23/2021 08:16 AM    CHOL/HDL Ratio 4.0 10/23/2021 08:16 AM     Lab Results   Component Value Date/Time    Glucose (POC) 138 (H) 11/08/2021 12:25 PM    Glucose (POC) 99 11/08/2021 06:43 AM    Glucose (POC) 156 (H) 11/07/2021 09:28 PM    Glucose (POC) 117 11/07/2021 04:23 PM    Glucose (POC) 162 (H) 11/07/2021 11:39 AM     No results found for: COLOR, APPRN, SPGRU, REFSG, ALFONSO, PROTU, GLUCU, KETU, BILU, UROU, AMALIA, LEUKU, GLUKE, EPSU, BACTU, WBCU, RBCU, CASTS, UCRY      Medications Reviewed:     Current Facility-Administered Medications   Medication Dose Route Frequency    isosorbide mononitrate ER (IMDUR) tablet 120 mg  120 mg Oral 7am    [Held by provider] losartan (COZAAR) tablet 50 mg  50 mg Oral DAILY    phenytoin (DILANTIN) chewable tablet 100 mg  100 mg Oral TID    metoprolol tartrate (LOPRESSOR) tablet 6.25 mg  6.25 mg Oral BID    lacosamide (VIMPAT) tablet 200 mg  200 mg Oral BID    atorvastatin (LIPITOR) tablet 40 mg  40 mg Oral DAILY    calcium carbonate (TUMS) chewable tablet 200 mg [elemental]  200 mg Oral TID PRN    levETIRAcetam (KEPPRA) tablet 1,500 mg  1,500 mg Oral BID    cholecalciferol (VITAMIN D3) (1000 Units /25 mcg) tablet 5,000 Units  5,000 Units Oral BID    glucose chewable tablet 16 g  4 Tablet Oral PRN    dextrose (D50W) injection syrg 12.5-25 g  25-50 mL IntraVENous PRN    glucagon (GLUCAGEN) injection 1 mg  1 mg IntraMUSCular PRN    insulin lispro (HUMALOG) injection   SubCUTAneous AC&HS    acetaminophen (TYLENOL) tablet 650 mg  650 mg Oral Q4H PRN    Or    acetaminophen (TYLENOL) solution 650 mg  650 mg Per NG tube Q4H PRN    Or    acetaminophen (TYLENOL) suppository 650 mg  650 mg Rectal Q4H PRN     ______________________________________________________________________  EXPECTED LENGTH OF STAY: 3d 7h  ACTUAL LENGTH OF STAY:          Charity Duron MD

## 2023-07-18 NOTE — PROGRESS NOTES
"[facility] Clinical Pharmacy Services: Vancomycin Pharmacokinetic Initial Consult Note    Grayson Rosas Jr. is a 53 y.o. male who is on day 2 of pharmacy to dose vancomycin for Osteomyelitis.    Consult Information  Consulting Provider: Everton Chaney MD  Planned Duration of Therapy: 7 days  Was Patient Receiving Prior to Admission/Consult?: No  Loading Dose Ordered Given: 3000 mg on  at 1626  PK/PD Target: -600 mg/L.hr   Relevant ID History: Previously completed course of amoxicillin-clavulanate prior to admission    Imaging Reviewed?: Yes  -There is a wound or ulceration of the distal tip of the 2nd digit.  Underlying soft tissue edema   is seen throughout the digit indicating underlying soft tissue cellulitis.  There is no definitive   abscess.  -Findings consistent with osteomyelitis involving the distal phalanx of the 2nd digit.  The proximal and middle phalanges appear to be spared    Microbiology Data  MRSA PCR performed:  Ordered ; Result: Pending  Culture/Source:   Blood culture(s)-2023: In process    Vitals/Labs  Ht: 177.8 cm (70\"); Wt: (!) 145 kg (318 lb 9 oz)  Temp (24hrs), Av.2 °F (36.8 °C), Min:97.9 °F (36.6 °C), Max:98.4 °F (36.9 °C)   Estimated Creatinine Clearance: 94.8 mL/min (by C-G formula based on SCr of 1.27 mg/dL).       Results from last 7 days   Lab Units 23  0503 23  1248   VANCOMYCIN RM mcg/mL 17.90  --    CREATININE mg/dL  --  1.27   WBC 10*3/mm3  --  10.67     Assessment/Plan:    Random level of 17.90 mcg/mL, drawn approximately 12 hours after loading dose.     Vancomycin Dose:  1000 mg IV every 12 hours; which provides the following predicted parameters:  AUC24,ss: 523 mg/L.hr  PAUC*: 77 %  Ctrough,ss: 17.5 mg/L  Pconc*: 39 %  Tox.: 13 %  Vanc Random ordered for 2023 at 0600 with morning labs   Patient has order for Basic Metabolic Panel    Pharmacy will follow patient's kidney function and will adjust doses and obtain levels as necessary. Thank " you for involving pharmacy in this patient's care. Please contact pharmacy with any questions or concerns.                           Chet Hanks RPH  Clinical Pharmacist

## 2023-07-18 NOTE — H&P (VIEW-ONLY)
[facility]    Referring Provider: No ref. provider found    Reason for Consultation: Osteomyelitis right foot    Patient Care Team:  Everton Chaney MD as PCP - General (Internal Medicine)    Chief complaint: Chronic wound with bone infection second digit    Subjective .     History of present illness:  Grayson Rosas Jr. is a 53 y.o. male who presents with full-thickness ulceration distal tuft second digit right foot.  Patient is established with podiatrist Dr. Chaney and Dr. Fowler.  Patient relates that he first noticed the issue July 3 and thought it was just loose skin and while picking at it created a ulcer.  Patient was seen by his podiatrist for local wound care.  Patient was last seen yesterday and his podiatrist was concerned with localized edema, cellulitis and exposure of bone that he was sent from the clinic to be admitted to the hospital for further work-up and evaluation.  Patient denies nausea, vomiting, fever, chills, shortness of breath, or other constitutional symptoms.  Patient has no other pedal complaint during today's examination.        Review of Systems: A 10 point review of system is unremarkable with exceptions of the chief complaint.  Patient specifically denies nausea, vomiting, fever, chills, shortness of breath, chest pain, abdominal pain, constipation, or difficulty urination.     History  Past Medical History:   Diagnosis Date    Amputation of toe of left foot     Amputation of toe of right foot     Diabetes mellitus     History of skin graft     Hx of eye surgery    , History reviewed. No pertinent surgical history.,   Family History   Problem Relation Age of Onset    Colon cancer Mother 77   ,   Social History     Tobacco Use    Smoking status: Never   Vaping Use    Vaping Use: Former   Substance Use Topics    Alcohol use: Not Currently    Drug use: Never   ,   Medications Prior to Admission   Medication Sig Dispense Refill Last Dose    atorvastatin  (LIPITOR) 20 MG tablet Take 1 tablet by mouth Every Night.   7/16/2023    HumaLOG KwikPen 100 UNIT/ML solution pen-injector Inject 25 Units under the skin into the appropriate area as directed 3 (Three) Times a Day Before Meals. PER SLIDING SCALE   7/16/2023    hydroCHLOROthiazide (HYDRODIURIL) 25 MG tablet Take 1 tablet by mouth Daily.   7/16/2023    Insulin Glargine (BASAGLAR KWIKPEN) 100 UNIT/ML injection pen Inject 35 Units under the skin into the appropriate area as directed 2 (Two) Times a Day.   7/16/2023    Jardiance 10 MG tablet tablet Take 1 tablet by mouth Daily.   7/16/2023    levothyroxine (SYNTHROID, LEVOTHROID) 25 MCG tablet Take 1 tablet by mouth Every Morning.   7/16/2023    Linzess 145 MCG capsule capsule Take 1 capsule by mouth Daily.   7/16/2023    losartan (COZAAR) 100 MG tablet Take 1 tablet by mouth Daily.   7/16/2023    metoprolol succinate XL (TOPROL-XL) 50 MG 24 hr tablet 1 tablet Every Morning.   7/16/2023    metoprolol succinate XL (TOPROL-XL) 50 MG 24 hr tablet Take 25 mg by mouth Every Evening.   7/16/2023   , Scheduled Meds:  atorvastatin, 20 mg, Oral, Nightly  empagliflozin, 10 mg, Oral, Daily  enoxaparin, 40 mg, Subcutaneous, Q12H  famotidine, 40 mg, Oral, Daily  insulin lispro, 2-9 Units, Subcutaneous, 4x Daily AC & at Bedtime  levothyroxine, 25 mcg, Oral, Q AM  linagliptin, 5 mg, Oral, Daily  losartan, 100 mg, Oral, Daily  metoprolol succinate XL, 50 mg, Oral, Q12H  senna-docusate sodium, 2 tablet, Oral, BID  sodium chloride, 10 mL, Intravenous, Q12H  vancomycin, 1,000 mg, Intravenous, Q12H    , PRN Meds:    acetaminophen    ALPRAZolam    aluminum-magnesium hydroxide-simethicone    senna-docusate sodium **AND** polyethylene glycol **AND** bisacodyl **AND** bisacodyl    dextrose    dextrose    glucagon (human recombinant)    HYDROcodone-acetaminophen    HYDROmorphone **AND** naloxone    Morphine **AND** naloxone    ondansetron    Pharmacy to Dose enoxaparin (LOVENOX)    Pharmacy to  dose vancomycin    sodium chloride    sodium chloride    sodium chloride    temazepam and Allergies:  Patient has no known allergies.    Objective     Vital Signs   Temp:  [97.9 °F (36.6 °C)-98.4 °F (36.9 °C)] 98.3 °F (36.8 °C)  Heart Rate:  [60-78] 73  Resp:  [16-18] 16  BP: (116-151)/(51-82) 138/63    Physical Exam:     General Appearance:    Alert, cooperative, in no acute distress                                   Derm: Right foot full-thickness ulceration noted distal tuft second digit.  Wound measures approximately 1.6 x 1.0 x 0.3 cm.  Fibrotic tissue noted with localized edema and slight necrotic tissue.  Wound does probe to distal phalanx.  No tunneling or tracking noted.    Neuro: Protective and gross sensations absent B/L LE.   Vascular: DP and PT pulses palpable B/L LE.  Localized edema along with localized erythema to the second digit.       MSK: History of fourth digit amputation right foot.     Pictures taken 7/18/2023    Patient gave verbal consent of all pictures taken today.    Results Review:   I reviewed the patient's new clinical results.      Assessment & Plan       Pyogenic inflammation of bone    Acute osteomyelitis of toe of right foot    Uncontrolled diabetes mellitus with hyperglycemia    Essential hypertension      Plan:     1.  Osteomyelitis distal phalanx second digit; right foot  2.  Diabetic foot infection with localized cellulitis; right foot        -Patient seen and examined this AM at his reclining chair.  All treatment options and imaging discussed with patient and to his satisfaction.  -MRI reviewed; findings consistent with osteomyelitis at the distal phalanx second digit with no evidence of bone infection at the proximal or middle phalangeal's.  -Nurse communication local wound care right foot.  Patient was consented for right foot second digit toe amputation.  The risks, benefits, and alternatives were discussed and patient verbalized understanding. All questions and concerns  were addressed this morning.  Giving his state of neuropathy and current history of recurrent ulcers on the digits, decision was made to provide better quality of life and prevent risk of further ulcerations.  Patient understood.   -NPO midnight tonight.  -Please hold all anticoagulation morning of surgery.      DISPO: Surgical intervention tentatively tomorrow morning at 715.        I discussed the patients findings and my recommendations with patient, nursing staff, and primary care team    Alonso Hickey DPM  07/18/23  10:23 EDT    Time: More than 50% of time spent in counseling and coordination of care:  Total face-to-face/floor time 30 min.  Time spent in counseling 30 min. Counseling included the following topics: coordination of care, risks, benefits, complications, alternatives to treatment including both non surgical and surgical options.       Part of this note may be an electronic transcription/translation of spoken language to printed text using the Dragon Dictation System.

## 2023-07-19 ENCOUNTER — APPOINTMENT (OUTPATIENT)
Dept: GENERAL RADIOLOGY | Facility: HOSPITAL | Age: 53
DRG: 617 | End: 2023-07-19
Payer: MEDICARE

## 2023-07-19 LAB
ANION GAP SERPL CALCULATED.3IONS-SCNC: 12.4 MMOL/L (ref 5–15)
BUN SERPL-MCNC: 25 MG/DL (ref 6–20)
BUN/CREAT SERPL: 21.6 (ref 7–25)
CALCIUM SPEC-SCNC: 9.3 MG/DL (ref 8.6–10.5)
CHLORIDE SERPL-SCNC: 101 MMOL/L (ref 98–107)
CO2 SERPL-SCNC: 23.6 MMOL/L (ref 22–29)
CREAT SERPL-MCNC: 1.16 MG/DL (ref 0.76–1.27)
EGFRCR SERPLBLD CKD-EPI 2021: 74.4 ML/MIN/1.73
GLUCOSE BLDC GLUCOMTR-MCNC: 165 MG/DL (ref 70–99)
GLUCOSE BLDC GLUCOMTR-MCNC: 173 MG/DL (ref 70–99)
GLUCOSE BLDC GLUCOMTR-MCNC: 173 MG/DL (ref 70–99)
GLUCOSE BLDC GLUCOMTR-MCNC: 212 MG/DL (ref 70–99)
GLUCOSE BLDC GLUCOMTR-MCNC: 216 MG/DL (ref 70–99)
GLUCOSE SERPL-MCNC: 159 MG/DL (ref 65–99)
POTASSIUM SERPL-SCNC: 4.3 MMOL/L (ref 3.5–5.2)
SODIUM SERPL-SCNC: 137 MMOL/L (ref 136–145)
VANCOMYCIN SERPL-MCNC: 15.5 MCG/ML (ref 5–40)

## 2023-07-19 PROCEDURE — 82948 REAGENT STRIP/BLOOD GLUCOSE: CPT

## 2023-07-19 PROCEDURE — 80048 BASIC METABOLIC PNL TOTAL CA: CPT | Performed by: INTERNAL MEDICINE

## 2023-07-19 PROCEDURE — 87015 SPECIMEN INFECT AGNT CONCNTJ: CPT | Performed by: PODIATRIST

## 2023-07-19 PROCEDURE — 87070 CULTURE OTHR SPECIMN AEROBIC: CPT | Performed by: INTERNAL MEDICINE

## 2023-07-19 PROCEDURE — 0 LIDOCAINE 1 % SOLUTION: Performed by: PODIATRIST

## 2023-07-19 PROCEDURE — 80202 ASSAY OF VANCOMYCIN: CPT | Performed by: INTERNAL MEDICINE

## 2023-07-19 PROCEDURE — 87205 SMEAR GRAM STAIN: CPT | Performed by: INTERNAL MEDICINE

## 2023-07-19 PROCEDURE — 25010000002 VANCOMYCIN 5 G RECONSTITUTED SOLUTION: Performed by: INTERNAL MEDICINE

## 2023-07-19 PROCEDURE — 88305 TISSUE EXAM BY PATHOLOGIST: CPT | Performed by: INTERNAL MEDICINE

## 2023-07-19 PROCEDURE — 0 BUPIVACAINE (PF) 0.25 % SOLUTION: Performed by: PODIATRIST

## 2023-07-19 PROCEDURE — 88311 DECALCIFY TISSUE: CPT | Performed by: INTERNAL MEDICINE

## 2023-07-19 PROCEDURE — 25010000002 MIDAZOLAM PER 1MG: Performed by: ANESTHESIOLOGY

## 2023-07-19 PROCEDURE — 25010000002 MORPHINE PER 10 MG: Performed by: PODIATRIST

## 2023-07-19 PROCEDURE — 87075 CULTR BACTERIA EXCEPT BLOOD: CPT | Performed by: PODIATRIST

## 2023-07-19 PROCEDURE — 25010000002 ENOXAPARIN PER 10 MG: Performed by: PODIATRIST

## 2023-07-19 PROCEDURE — 63710000001 INSULIN LISPRO (HUMAN) PER 5 UNITS: Performed by: PODIATRIST

## 2023-07-19 PROCEDURE — 0Y6R0Z0 DETACHMENT AT RIGHT 2ND TOE, COMPLETE, OPEN APPROACH: ICD-10-PCS | Performed by: PODIATRIST

## 2023-07-19 PROCEDURE — 73630 X-RAY EXAM OF FOOT: CPT

## 2023-07-19 RX ORDER — ACETAMINOPHEN 500 MG
1000 TABLET ORAL ONCE
Status: COMPLETED | OUTPATIENT
Start: 2023-07-19 | End: 2023-07-19

## 2023-07-19 RX ORDER — PROMETHAZINE HYDROCHLORIDE 25 MG/1
25 SUPPOSITORY RECTAL ONCE AS NEEDED
Status: DISCONTINUED | OUTPATIENT
Start: 2023-07-19 | End: 2023-07-19 | Stop reason: HOSPADM

## 2023-07-19 RX ORDER — PROMETHAZINE HYDROCHLORIDE 12.5 MG/1
25 TABLET ORAL ONCE AS NEEDED
Status: DISCONTINUED | OUTPATIENT
Start: 2023-07-19 | End: 2023-07-19 | Stop reason: HOSPADM

## 2023-07-19 RX ORDER — MAGNESIUM HYDROXIDE 1200 MG/15ML
LIQUID ORAL AS NEEDED
Status: DISCONTINUED | OUTPATIENT
Start: 2023-07-19 | End: 2023-07-19 | Stop reason: HOSPADM

## 2023-07-19 RX ORDER — HYDROMORPHONE HYDROCHLORIDE 2 MG/1
2 TABLET ORAL
Status: DISCONTINUED | OUTPATIENT
Start: 2023-07-19 | End: 2023-07-19 | Stop reason: HOSPADM

## 2023-07-19 RX ORDER — MIDAZOLAM HYDROCHLORIDE 2 MG/2ML
2 INJECTION, SOLUTION INTRAMUSCULAR; INTRAVENOUS ONCE
Status: COMPLETED | OUTPATIENT
Start: 2023-07-19 | End: 2023-07-19

## 2023-07-19 RX ORDER — SODIUM CHLORIDE, SODIUM LACTATE, POTASSIUM CHLORIDE, CALCIUM CHLORIDE 600; 310; 30; 20 MG/100ML; MG/100ML; MG/100ML; MG/100ML
9 INJECTION, SOLUTION INTRAVENOUS CONTINUOUS PRN
Status: DISCONTINUED | OUTPATIENT
Start: 2023-07-19 | End: 2023-07-20 | Stop reason: HOSPADM

## 2023-07-19 RX ORDER — ONDANSETRON 2 MG/ML
4 INJECTION INTRAMUSCULAR; INTRAVENOUS ONCE AS NEEDED
Status: DISCONTINUED | OUTPATIENT
Start: 2023-07-19 | End: 2023-07-19 | Stop reason: HOSPADM

## 2023-07-19 RX ORDER — BUPIVACAINE HYDROCHLORIDE 2.5 MG/ML
INJECTION, SOLUTION EPIDURAL; INFILTRATION; INTRACAUDAL AS NEEDED
Status: DISCONTINUED | OUTPATIENT
Start: 2023-07-19 | End: 2023-07-19 | Stop reason: HOSPADM

## 2023-07-19 RX ORDER — MEPERIDINE HYDROCHLORIDE 25 MG/ML
12.5 INJECTION INTRAMUSCULAR; INTRAVENOUS; SUBCUTANEOUS
Status: DISCONTINUED | OUTPATIENT
Start: 2023-07-19 | End: 2023-07-19 | Stop reason: HOSPADM

## 2023-07-19 RX ORDER — LIDOCAINE HYDROCHLORIDE 10 MG/ML
INJECTION, SOLUTION INFILTRATION; PERINEURAL AS NEEDED
Status: DISCONTINUED | OUTPATIENT
Start: 2023-07-19 | End: 2023-07-19 | Stop reason: HOSPADM

## 2023-07-19 RX ADMIN — MORPHINE SULFATE 2 MG: 2 INJECTION, SOLUTION INTRAMUSCULAR; INTRAVENOUS at 10:05

## 2023-07-19 RX ADMIN — METOPROLOL SUCCINATE 50 MG: 50 TABLET, EXTENDED RELEASE ORAL at 21:52

## 2023-07-19 RX ADMIN — ATORVASTATIN CALCIUM 20 MG: 20 TABLET, FILM COATED ORAL at 21:52

## 2023-07-19 RX ADMIN — EMPAGLIFLOZIN 10 MG: 10 TABLET, FILM COATED ORAL at 10:04

## 2023-07-19 RX ADMIN — FAMOTIDINE 40 MG: 20 TABLET ORAL at 10:04

## 2023-07-19 RX ADMIN — DOCUSATE SODIUM 50MG AND SENNOSIDES 8.6MG 2 TABLET: 8.6; 5 TABLET, FILM COATED ORAL at 21:53

## 2023-07-19 RX ADMIN — LINAGLIPTIN 5 MG: 5 TABLET, FILM COATED ORAL at 10:03

## 2023-07-19 RX ADMIN — HYDROCODONE BITARTRATE AND ACETAMINOPHEN 1 TABLET: 5; 325 TABLET ORAL at 08:31

## 2023-07-19 RX ADMIN — INSULIN LISPRO 2 UNITS: 100 INJECTION, SOLUTION INTRAVENOUS; SUBCUTANEOUS at 21:52

## 2023-07-19 RX ADMIN — Medication 10 ML: at 10:03

## 2023-07-19 RX ADMIN — VANCOMYCIN HYDROCHLORIDE 1250 MG: 5 INJECTION, POWDER, LYOPHILIZED, FOR SOLUTION INTRAVENOUS at 21:53

## 2023-07-19 RX ADMIN — VANCOMYCIN HYDROCHLORIDE 1250 MG: 5 INJECTION, POWDER, LYOPHILIZED, FOR SOLUTION INTRAVENOUS at 10:23

## 2023-07-19 RX ADMIN — DOCUSATE SODIUM 50MG AND SENNOSIDES 8.6MG 2 TABLET: 8.6; 5 TABLET, FILM COATED ORAL at 10:07

## 2023-07-19 RX ADMIN — MIDAZOLAM HYDROCHLORIDE 2 MG: 1 INJECTION, SOLUTION INTRAMUSCULAR; INTRAVENOUS at 07:23

## 2023-07-19 RX ADMIN — INSULIN LISPRO 2 UNITS: 100 INJECTION, SOLUTION INTRAVENOUS; SUBCUTANEOUS at 10:04

## 2023-07-19 RX ADMIN — INSULIN LISPRO 4 UNITS: 100 INJECTION, SOLUTION INTRAVENOUS; SUBCUTANEOUS at 12:46

## 2023-07-19 RX ADMIN — ENOXAPARIN SODIUM 40 MG: 100 INJECTION SUBCUTANEOUS at 21:53

## 2023-07-19 RX ADMIN — LOSARTAN POTASSIUM 100 MG: 25 TABLET, FILM COATED ORAL at 10:04

## 2023-07-19 RX ADMIN — ENOXAPARIN SODIUM 40 MG: 100 INJECTION SUBCUTANEOUS at 10:04

## 2023-07-19 RX ADMIN — METOPROLOL SUCCINATE 50 MG: 50 TABLET, EXTENDED RELEASE ORAL at 10:04

## 2023-07-19 RX ADMIN — INSULIN LISPRO 4 UNITS: 100 INJECTION, SOLUTION INTRAVENOUS; SUBCUTANEOUS at 17:21

## 2023-07-19 RX ADMIN — Medication 10 ML: at 21:53

## 2023-07-19 RX ADMIN — ACETAMINOPHEN 1000 MG: 500 TABLET ORAL at 07:22

## 2023-07-19 RX ADMIN — LEVOTHYROXINE SODIUM 25 MCG: 25 TABLET ORAL at 10:06

## 2023-07-19 NOTE — PROGRESS NOTES
[facility]     Progress Note    Patient Name: Grayson Rosas Jr.  : 1970  MRN: [id number]  Primary Care Physician:  Everton Chaney MD  Date of admission: 2023      Subjective   Brief summary.  Patient admitted with osteomyelitis of the toe      HPI:  Patient post amputation of the toe.  Feeling good, no new complaints, awake alert and oriented.    Review of Systems     Fatigue,  No fever chills  No nausea vomiting  Blood sugar stable      Objective     Vitals:   Temp:  [97.5 °F (36.4 °C)-98.7 °F (37.1 °C)] 97.5 °F (36.4 °C)  Heart Rate:  [59-78] 72  Resp:  [14-20] 18  BP: (112-160)/(48-79) 139/68    Physical Exam :   Middle-aged morbidly obese male not in acute distress.  Heart regular.  Lungs clear.  Abdomen soft nontender.  Morbidly obese  Extremities trace of edema  Right foot in surgical dressing      Result Review:  I have personally reviewed the results from the time of this admission to 2023 10:53 EDT and agree with these findings:  [x]  Laboratory  []  Microbiology  []  Radiology  []  EKG/Telemetry   []  Cardiology/Vascular   []  Pathology  []  Old records  []  Other:           Assessment / Plan       Active Hospital Problems:  Active Hospital Problems    Diagnosis     **Pyogenic inflammation of bone     Acute osteomyelitis of toe of right foot     Uncontrolled diabetes mellitus with hyperglycemia     Essential hypertension        Plan:   Continue IV vancomycin.  Post amputation  We will continue antibiotic for couple of days and switch to oral and discharging home.  Increase activity       DVT prophylaxis:  Medical and mechanical DVT prophylaxis orders are present.    CODE STATUS:   Level Of Support Discussed With: Patient  Code Status (Patient has no pulse and is not breathing): CPR (Attempt to Resuscitate)  Medical Interventions (Patient has pulse or is breathing): Full Support              Electronically signed by Everton Chaney MD, 23, 10:55 AM EDT.

## 2023-07-19 NOTE — PLAN OF CARE
Goal Outcome Evaluation:  Plan of Care Reviewed With: patient        Progress: improving  Outcome Evaluation: Alert and oriented x4. Patient had second toe amputation on right foot this shift. Patient mobilizing well to the bathroom and around the room. Surgical bandage reinforced with ACE wrap and kerlix per podiatrist orders. Medicated x3 with insulin this shift. x1 complaints of pain, medicated per MAR. vital signs stable at this time. no new issues at this time.

## 2023-07-19 NOTE — INTERVAL H&P NOTE
H&P reviewed. The patient was examined and there are no changes to the H&P.      Alonso Hickey DPM  [facility]

## 2023-07-19 NOTE — PLAN OF CARE
Goal Outcome Evaluation:  Plan of Care Reviewed With: patient        Progress: no change  Outcome Evaluation: denies pain/discomfort this shift. wound care maintained per orders. plan npo after mn tonight for surgical amputation of toe tomorrow. no new issues/needs noted at this time.

## 2023-07-19 NOTE — PLAN OF CARE
Goal Outcome Evaluation:           Progress: no change  Outcome Evaluation: Denies pain. Patient NPO at midnight, informed consent signed. Vital signs stable, will continue to monitor.

## 2023-07-19 NOTE — PROGRESS NOTES
"[facility] Clinical Pharmacy Services: Vancomycin Monitoring Note    Grayson Rosas Jr. is a 53 y.o. male who is on day 3/7 of pharmacy to dose vancomycin for Skin and Soft Tissue.    Previous Vancomycin Dose:   1000 mg IV every  12  hours  Imaging Reviewed?: N/A  Updated Cultures and Sensitivities:   23: MRSA PCR: MRSA DETECTED  23: BLOOD CX, LEFT ARM: NGD1  23: BLOOD CX, RIGHT ARM: NGD1    Vitals/Labs  Ht: 177.8 cm (70\"); Wt: (!) 145 kg (318 lb 9 oz)     Temp (24hrs), Av.1 °F (36.7 °C), Min:97.5 °F (36.4 °C), Max:98.7 °F (37.1 °C)    Estimated Creatinine Clearance: 103.8 mL/min (by C-G formula based on SCr of 1.16 mg/dL).       Results from last 7 days   Lab Units 23  0532 23  0503 23  1248   VANCOMYCIN RM mcg/mL 15.50 17.90  --    CREATININE mg/dL 1.16  --  1.27   WBC 10*3/mm3  --   --  10.67     Assessment/Plan    Current Vancomycin Dose:  1000 mg IV every 12 hours; which provides the following predicted parameters:  AUC24,ss: 396 mg/L.hr  PAUC*: 44 %  Ctrough,ss: 11.6 mg/L  Pconc*: 0 %  Tox.: 7 %  Low AUC. Will change to 1250 mg q12hr: AUC24,ss: 495 mg/L.hr, Ctrough,ss: 14.5 mg/L and, Tox.: 10 %  Next Vanc Random ordered for tomorrow at 0600  We will continue to monitor patient changes and renal function     Thank you for involving pharmacy in this patient's care. Please contact pharmacy with any questions or concerns.    Eve Delacruz  Clinical Pharmacist    "

## 2023-07-19 NOTE — BRIEF OP NOTE
AMPUTATION DIGIT  Progress Note    Grayson Rosas JrJosh  7/19/2023    Pre-op Diagnosis:   Other acute osteomyelitis of right foot [M86.171]       Post-Op Diagnosis Codes:     * Other acute osteomyelitis of right foot [M86.171]    Procedure/CPT® Codes:    Procedure(s):  RIGHT FOOT:  AMPUTATION 2ND DIGIT    Surgeon(s):  Alonso Hickey DPM    Anesthesia: Monitored Anesthesia Care    Staff:   Circulator: Marzena Hare RN  Scrub Person: Arti Santos  Assistant: Darby Oneal CSA  Other: Yoselyn Henriquez RN  Assistant: Darby Oneal CSA      Estimated Blood Loss: 5 mL    Urine Voided: * No values recorded between 7/19/2023  7:25 AM and 7/19/2023  8:02 AM *    Specimens:     Right foot second digit             Drains: * No LDAs found *    Findings: See op report    Complications: None    Assistant: Darby Oneal CSA  was responsible for performing the following activities: Retraction, Suction, and Placing Dressing and their skilled assistance was necessary for the success of this case.    Alonso Hickey DPM     Date: 7/19/2023  Time: 08:03 EDT

## 2023-07-19 NOTE — OP NOTE
Grayson Rosas   7/19/2023    Pre-op Diagnosis:   Other acute osteomyelitis of right foot [M86.171]       Post-Op Diagnosis Codes:     * Other acute osteomyelitis of right foot [M86.171]    Procedure/CPT® Codes:    Procedure(s):  RIGHT FOOT:  AMPUTATION 2ND DIGIT    Surgeon(s):  Alonso Hickey DPM    Anesthesia: Monitored Anesthesia Care    Staff:   Circulator: Marzena Hare RN  Scrub Person: Arti Santos  Assistant: Darby Oneal CSA  Other: Yoselyn Henriquez RN  Assistant: Darby Oneal CSA      Estimated Blood Loss: 5 mL    Urine Voided: * No values recorded between 7/19/2023  7:25 AM and 7/19/2023  8:02 AM *    Specimens:     Right foot second digit             Drains: * No LDAs found *    Findings: See op report    Complications: None      INDICATIONS FOR PROCEDURE: This patient has signs and symptoms clinically and radiographically consistent with the above mentioned preoperative diagnosis.  Patient was last seen in the office with concern of diabetic foot infection due to chronic ulceration to the second digit.  Patient was admitted for osteomyelitis second digit and was confirmed with MRI evaluation. Having failed conservative treatment, it was determined that the patient would benefit from surgical intervention. All potential risks, benefits, and complications were discussed with the patient prior to the scheduling of surgery. All the patient's questions were answered and no guarantees were given. The patient wished to proceed with surgery, and informed written consent was obtained.     DETAILS OF PROCEDURE: The patient was brought from the pre-operative area and placed on the operating table in the supine position. A pneumatic ankle tourniquet was placed around the patient's well-padded right lower extremity. Following IV sedation, a local anesthetic block was then injected proximal to the incision site consisting of 20 cc of 1% lidocaine plain. The right lower extremity  was then scrubbed, prepped, and draped in the usual sterile fashion. A time-out was performed. The patient, procedure, and operative site were confirmed. An Esmarch bandage was then utilized to exsanguinate the patient's right lower extremity. The tourniquet was then inflated to 250 mmHg and the following procedure was performed.    Detail of procedure #1: Details of Procedure #1: Attention was then directed to the second digit on the right foot.  It was noted a full-thickness ulceration at the distal tuft measuring 1.1 x 1.0 x 0.4 cm with probing to bone.  Next, a towel clamp was then clamped around the distal aspect of the second digit and used to stabilize the digit. A #15 surgical blade was used to make a full thickness modified racquet incision through the skin. The incision was then deepened through the subcutaneous tissue to the level of bone. Next, the second MTP joint was identified and attention was then directed to this location. A #15 blade was then used to release the extensor tendon as well as the medial and lateral collateral ligaments present at the second MTP joint. The flexor tendon was then severed planarly at the level of the second MTP joint and digit removed distally and passed from the operative field and placed on the back table and was sent off to pathology for further sensitivities.      Attention was then directed to the head of the second metatarsal. The cartilage was noted to white, shiny, pearly and hard, all which are consistent with healthy cartilage. It was determined that no further resection was necessary.  Next, a culture swab was taken of the area and was sent off to microbiology for further sensitivities and culturing.  At this time, the incision site was flushed using copious amounts of normal saline. The deep and subcutaneous tissue was reapproximated using 3-0 Vicryl.  Next, the skin edges were reapproximated using 3-0 nylon in a horizontal mattress and simple interrupted  suture technique.    At this time, a local anesthetic was injected about the incision sites consisting of 10 mL of 0.5% Marcaine plain, for the patient's postoperative comfort. A soft sterile dressing was applied consisting of Xeroform, dry sterile gauze, Kerlix, and Ace bandage. The pneumatic ankle tourniquet was rapidly deflated after a total time of 12 minutes and a prompt hyperemic response was noted on all aspects of the patient's right lower extremity.    END OF PROCEDURE: The patient tolerated the procedure and anesthesia well and was transported from the operating room to the PACU with vital signs stable and vascular status intact to all aspects of the patient's right lower extremity and digital capillary refill time immediate to the remaining digits of the right foot. Following a period of post-operative monitoring, the patient will be back to his in-house room to continuously receive broad-spectrum IV antibiotics.  Surgery felt to be definitive and no further intervention deemed necessary at this time.  Patient okay for discharge pending medical clearance and outpatient antibiotics.  Patient can follow-up with Dr. Fowler in her private office within 5 to 7 days after being discharged from the hospital.  The patient's keep the dressing clean, dry, intact at all times.  Patient can ambulate wearing postop surgical shoe to the right lower extremity.    This operative report was dictated by Alonso Hickey DPM.    Alonso Hickey DPM  [facility]

## 2023-07-19 NOTE — DISCHARGE INSTRUCTIONS
Activity  ? Gradually resume normal activity.    Bathing  ? No tub bathing, no showering, the dressing cannot get wet.    Diet  ? Gradually resume regular diet, as tolerated.    Driving (if applicable)  ? No driving for 24 hours post-surgery due to the anesthetic or anytime you are on pain medication.    Exercise  ? Discuss this with your physician.    Educational  ? The medication which was used to put you to sleep will be acting in your body for the next 24 hours, so you might feel a little sleepy. This feeling will slowly wear off. For the first 24 hours, you should NOT:  ? Drive a car, operate machinery, or power tools.  ? Drink any alcoholic drinks (even beer).  ? Make any important decisions, sign any important or legal papers.  ? For your safety, we strongly suggest that a responsible adult stay with you for the rest of the day and during the night.  ? If you have ALEKSANDER hose we recommend wearing these until you resume your normal level of activity. These stockings help increase circulation and decrease the risk of blood clot after surgery.      Medication  ? Narcotic pain medications can cause constipation. You may take an over-the-counter stool softener or laxative if needed.  ? A bowel movement every other day is reasonable.  ? It is very important to properly dispose of unused narcotic medications. Please contact your local pharmacy or visit [email or web address] website for further instructions.    Notifications  ? If you have any of the following, notify the physician:  ? Fever greater than 101F  ? Pain unrelieved by your pain medication  ? Blood dripping out of the dressing  ? Questions or other problems    Return to Work/School  ? You may return to work/school at your physician's discretion.    Weight Bearing  ? If you have a cast or splint, you should NOT bear any weight on the affected extremity. Use crutches, wheelchair, or knee scooter.  ? If you do NOT have a cast or splint, but were given a  surgical shoe, you may bear partial weight on the affected extremity.    Wound Care  ? Keep dressing clean and dry.  ? Rest, ice, and elevate.  ? Rest when you are not walking to the bathroom or kitchen.  ? Ice the operative area for 15-20 minutes every hour, repeat each hour while you are awake.  ? Elevate the area above the heart if possible (toes above the nose).      Suicidal Feelings  If you are having thoughts of suicide or injuring yourself, get help right away.                 Call 911                 Call a suicide hotline to speak to a counselor. 9-886-552-TALK or 6-287-LVGRDXK

## 2023-07-20 VITALS
DIASTOLIC BLOOD PRESSURE: 73 MMHG | SYSTOLIC BLOOD PRESSURE: 151 MMHG | TEMPERATURE: 97.2 F | WEIGHT: 315 LBS | HEIGHT: 70 IN | OXYGEN SATURATION: 98 % | HEART RATE: 80 BPM | BODY MASS INDEX: 45.1 KG/M2 | RESPIRATION RATE: 16 BRPM

## 2023-07-20 PROBLEM — M86.9 PYOGENIC INFLAMMATION OF BONE: Status: RESOLVED | Noted: 2023-07-17 | Resolved: 2023-07-20

## 2023-07-20 PROBLEM — M86.171 ACUTE OSTEOMYELITIS OF TOE OF RIGHT FOOT: Status: RESOLVED | Noted: 2023-07-17 | Resolved: 2023-07-20

## 2023-07-20 LAB
ANION GAP SERPL CALCULATED.3IONS-SCNC: 8.7 MMOL/L (ref 5–15)
BUN SERPL-MCNC: 28 MG/DL (ref 6–20)
BUN/CREAT SERPL: 26.9 (ref 7–25)
CALCIUM SPEC-SCNC: 9 MG/DL (ref 8.6–10.5)
CHLORIDE SERPL-SCNC: 106 MMOL/L (ref 98–107)
CO2 SERPL-SCNC: 24.3 MMOL/L (ref 22–29)
CREAT SERPL-MCNC: 1.04 MG/DL (ref 0.76–1.27)
EGFRCR SERPLBLD CKD-EPI 2021: 84.8 ML/MIN/1.73
GLUCOSE BLDC GLUCOMTR-MCNC: 103 MG/DL (ref 70–99)
GLUCOSE BLDC GLUCOMTR-MCNC: 110 MG/DL (ref 70–99)
GLUCOSE BLDC GLUCOMTR-MCNC: 237 MG/DL (ref 70–99)
GLUCOSE SERPL-MCNC: 80 MG/DL (ref 65–99)
POTASSIUM SERPL-SCNC: 4.4 MMOL/L (ref 3.5–5.2)
SODIUM SERPL-SCNC: 139 MMOL/L (ref 136–145)
VANCOMYCIN SERPL-MCNC: 23.8 MCG/ML (ref 5–40)

## 2023-07-20 PROCEDURE — 25010000002 VANCOMYCIN 5 G RECONSTITUTED SOLUTION: Performed by: INTERNAL MEDICINE

## 2023-07-20 PROCEDURE — 82948 REAGENT STRIP/BLOOD GLUCOSE: CPT

## 2023-07-20 PROCEDURE — 80048 BASIC METABOLIC PNL TOTAL CA: CPT | Performed by: INTERNAL MEDICINE

## 2023-07-20 PROCEDURE — 80202 ASSAY OF VANCOMYCIN: CPT | Performed by: INTERNAL MEDICINE

## 2023-07-20 PROCEDURE — 25010000002 ENOXAPARIN PER 10 MG: Performed by: PODIATRIST

## 2023-07-20 RX ORDER — HYDROCODONE BITARTRATE AND ACETAMINOPHEN 5; 325 MG/1; MG/1
1 TABLET ORAL EVERY 4 HOURS PRN
Qty: 20 TABLET | Refills: 0 | Status: SHIPPED | OUTPATIENT
Start: 2023-07-20 | End: 2023-07-25

## 2023-07-20 RX ORDER — DOXYCYCLINE HYCLATE 100 MG/1
100 CAPSULE ORAL 2 TIMES DAILY
Qty: 20 CAPSULE | Refills: 0 | Status: SHIPPED | OUTPATIENT
Start: 2023-07-20 | End: 2023-07-30

## 2023-07-20 RX ADMIN — METOPROLOL SUCCINATE 50 MG: 50 TABLET, EXTENDED RELEASE ORAL at 09:05

## 2023-07-20 RX ADMIN — Medication 10 ML: at 09:05

## 2023-07-20 RX ADMIN — LINAGLIPTIN 5 MG: 5 TABLET, FILM COATED ORAL at 09:04

## 2023-07-20 RX ADMIN — ENOXAPARIN SODIUM 40 MG: 100 INJECTION SUBCUTANEOUS at 09:05

## 2023-07-20 RX ADMIN — LEVOTHYROXINE SODIUM 25 MCG: 25 TABLET ORAL at 06:30

## 2023-07-20 RX ADMIN — FAMOTIDINE 40 MG: 20 TABLET ORAL at 09:05

## 2023-07-20 RX ADMIN — EMPAGLIFLOZIN 10 MG: 10 TABLET, FILM COATED ORAL at 09:05

## 2023-07-20 RX ADMIN — VANCOMYCIN HYDROCHLORIDE 1250 MG: 5 INJECTION, POWDER, LYOPHILIZED, FOR SOLUTION INTRAVENOUS at 09:06

## 2023-07-20 RX ADMIN — LOSARTAN POTASSIUM 100 MG: 25 TABLET, FILM COATED ORAL at 09:05

## 2023-07-20 NOTE — CONSULTS
07/19/23 1356   Coping/Psychosocial   Additional Documentation Spiritual Care (Group)   Spiritual Care   Spiritual Care Source  initiative   Spiritual Care Follow-Up follow-up, none required as presently assessed   Response to Spiritual Care receptive of support;engaged in conversation   Spiritual Care Interventions supportive conversation provided   Spiritual Care Visit Type initial   Spiritual Care Request coping/stress of illness support;spiritual/moral support   Receptivity to Spiritual Care visit welcomed

## 2023-07-20 NOTE — PROGRESS NOTES
[facility]     LOS: 3 days   Patient Care Team:  Everton Chaney MD as PCP - General (Internal Medicine)    Chief Complaint: Right foot pain    Subjective   Patient laying comfortably in bed. No acute overnight events. Pain controlled. Tolerating diet. Denies any F/C/N/V/SOB/CP, and leg cramping.       Objective     Vital Signs  Temp:  [97.5 °F (36.4 °C)-98.7 °F (37.1 °C)] 97.9 °F (36.6 °C)  Heart Rate:  [59-78] 66  Resp:  [14-20] 20  BP: (112-157)/(48-78) 140/68    Physical Exam:     General Appearance:    Alert, cooperative, in no acute distress.  Dressing change deferred external dressing layer clean, dry, intact without any external drainage noted.  Patient able to wiggle all remaining digits without any pain or complication.  No pain during calf compression.                                           Assessment & Plan       Pyogenic inflammation of bone    Acute osteomyelitis of toe of right foot    Uncontrolled diabetes mellitus with hyperglycemia    Essential hypertension      Plan:  S/P 2nd digit toe amputation; right foot  Osteomyelitis distal phalanx second digit; right foot  Diabetic foot infection with localized cellulitis; right foot      -Patient seen and examined this AM at his reclining chair.  All treatment options and imaging discussed with patient and to his satisfaction.  -MRI reviewed; findings consistent with osteomyelitis at the distal phalanx second digit with no evidence of bone infection at the proximal or middle phalangeal.  -Patient ambulating wearing postop surgical shoe at all times to the right lower extremity.          Plan for disposition: S/p second digit toe amputation right foot.  Procedure was felt to be definitive.  No further surgical intervention and okay for discharge from podiatry standpoint pending medical clearance.  Patient can follow-up with Dr. Fowler 1 week after being discharged from the hospital.    Alonso Hickey DPM  07/20/23  05:59  EDT      Time: More than 50% of time spent in counseling and coordination of care:  Total face-to-face/floor time 30 min.  Time spent in counseling 30 min. Counseling included the following topics: coordination of care, risks, benefits, complications, alternatives to treatment including both non surgical and surgical options.     Part of this note may be an electronic transcription/translation of spoken language to printed text using the Dragon Dictation System.

## 2023-07-20 NOTE — PROGRESS NOTES
"[facility] Clinical Pharmacy Services: Vancomycin Monitoring Note    Grayson Rosas Jr. is a 53 y.o. male who is on day  of pharmacy to dose vancomycin for Skin and Soft Tissue.    Previous Vancomycin Dose:   1000 mg IV every  12  hours  Imaging Reviewed?: N/A  Updated Cultures and Sensitivities:   23: MRSA PCR: MRSA DETECTED  23: BLOOD CX, LEFT ARM: NGD1  23: BLOOD CX, RIGHT ARM: NGD1  23: Wound culture (Preliminary result): No growth, Few (2+) Gram positive cocci in pairs     Vitals/Labs  Ht: 177.8 cm (70\"); Wt: (!) 145 kg (318 lb 9 oz)     Temp (24hrs), Av.7 °F (36.5 °C), Min:97.5 °F (36.4 °C), Max:98.4 °F (36.9 °C)    Estimated Creatinine Clearance: 115.8 mL/min (by C-G formula based on SCr of 1.04 mg/dL).       Results from last 7 days   Lab Units 23  0524 23  0532 23  0503 23  1248   VANCOMYCIN RM mcg/mL 23.80 15.50 17.90  --    CREATININE mg/dL 1.04 1.16  --  1.27   WBC 10*3/mm3  --   --   --  10.67     Assessment/Plan    Random level this morning of 23.80 mcg/mL, collected approximately 7 hours after previous dose. Will adjust dose to 1000 mg every 12 hours, to reduce toxicity risk.     Current Vancomycin Dose:  1000 mg IV every 12 hours; which provides the following predicted parameters:  AUC24,ss: 466 mg/L.hr  PAUC*: 96 %  Ctrough,ss: 15.4 mg/L  Pconc*: 1 %  Tox.: 11 %  No level currently ordered. Patient is now post-op. Plan is for patient to discharge on oral antibiotics.   We will continue to monitor patient changes and renal function     Thank you for involving pharmacy in this patient's care. Please contact pharmacy with any questions or concerns.    Chet Hanks [facility]  Clinical Pharmacist      "

## 2023-07-20 NOTE — CONSULTS
Discharge Planning Assessment  LEIA Sen     Patient Name: Grayson Rosas Jr.  MRN: [id number]  Today's Date: 7/20/2023    Admit Date: 7/17/2023   Discharge Plan       Row Name 07/20/23 0858       Plan    Final Discharge Disposition Code 01 - home or self-care    Final Note Pt with orders to discharge home today. No needs identified.             Expected Discharge Date and Time       Expected Discharge Date Expected Discharge Time    Jul 20, 2023         FARIDA Orourke

## 2023-07-20 NOTE — PLAN OF CARE
Goal Outcome Evaluation: Discharge home today. Surgical dressing left intact.

## 2023-07-20 NOTE — PLAN OF CARE
Goal Outcome Evaluation:  Plan of Care Reviewed With: patient        Progress: no change  Outcome Evaluation: Reinforced surgical dressing remains C/D/I overnight. No reports of pain. No issues observed or reported. Educated on wearing surgical shoe

## 2023-07-20 NOTE — DISCHARGE SUMMARY
2023               [facility]         DISCHARGE SUMMARY    Patient Name: Graysno Rosas Jr.  : 1970  MRN: [id number]    Date of Admission: 2023  Date of Discharge: 2023  Primary Care Physician: Everton Chaney MD    Consults       Date and Time Order Name Status Description    2023  9:06 AM Inpatient Podiatry Consult Completed     2023  3:54 PM IP General Consult (Use specialty-specific consult if known)              Presenting Problem:   Acute osteomyelitis of toe of right foot [M86.171]  Osteomyelitis of right foot, unspecified type [M86.9]    Active and Resolved Hospital Problems:  Active Hospital Problems    Diagnosis POA    Uncontrolled diabetes mellitus with hyperglycemia [E11.65] Yes    Essential hypertension [I10] Yes      Resolved Hospital Problems    Diagnosis POA    **Pyogenic inflammation of bone [M86.9] Yes    Acute osteomyelitis of toe of right foot [M86.171] Yes         Hospital Course     Hospital Course:  Grayson Rosas Jr. is a 53 y.o. male admitted to hospital post evaluation by podiatrist for osteomyelitis of the right toe.    Patient failed outpatient treatment.    Patient was started on IV vancomycin MRI confirmed osteomyelitis of the second toe on the right side.    He was seen by podiatrist who recommended surgical intervention and amputation which was performed yesterday.  Patient tolerated procedure very well.    Podiatrist cleared him for discharge yesterday.  Today he feels better he will be discharged on oral antibiotics.        DISCHARGE Follow Up Recommendations for labs and diagnostics:   Discharge to home  Follow-up as an outpatient      Day of Discharge     Vital Signs:  Temp:  [97.5 °F (36.4 °C)-98.4 °F (36.9 °C)] 97.6 °F (36.4 °C)  Heart Rate:  [61-72] 61  Resp:  [16-20] 16  BP: (119-157)/(59-78) 138/65    Physical Exam:    Middle-aged male obese not in acute distress.  Heart regular.  Lungs clear.  Abdomen soft.  Right foot in surgical  dressing, no significant edema      Pertinent  and/or Most Recent Results     LAB RESULTS:      Lab 07/18/23  1052 07/17/23  1248   WBC  --  10.67   HEMOGLOBIN  --  13.6   HEMATOCRIT  --  41.1   PLATELETS  --  371   NEUTROS ABS  --  8.02*   IMMATURE GRANS (ABS)  --  0.10*   LYMPHS ABS  --  1.39   MONOS ABS  --  0.77   EOS ABS  --  0.32   MCV  --  89.2   SED RATE 62*  --    CRP  --  2.61*   LACTATE  --  1.4         Lab 07/20/23  0524 07/19/23  0532 07/18/23  0503 07/17/23  1248   SODIUM 139 137  --  139   POTASSIUM 4.4 4.3  --  4.5   CHLORIDE 106 101  --  101   CO2 24.3 23.6  --  24.8   ANION GAP 8.7 12.4  --  13.2   BUN 28* 25*  --  20   CREATININE 1.04 1.16  --  1.27   EGFR 84.8 74.4  --  66.7   GLUCOSE 80 159*  --  175*   CALCIUM 9.0 9.3  --  9.3   HEMOGLOBIN A1C  --   --  8.60*  --          Lab 07/17/23  1248   TOTAL PROTEIN 7.8   ALBUMIN 4.2   GLOBULIN 3.6   ALT (SGPT) 18   AST (SGOT) 14   BILIRUBIN 0.3   ALK PHOS 93                     Brief Urine Lab Results       None          Microbiology Results (last 10 days)       Procedure Component Value - Date/Time    Wound Culture - Wound, Toe, Right [[id number]] Collected: 07/19/23 0737    Lab Status: Preliminary result Specimen: Wound from Toe, Right Updated: 07/20/23 0822     Wound Culture No growth     Gram Stain Few (2+) Gram positive cocci in pairs    MRSA Screen, PCR (Inpatient) - Swab, Nares [[id number]]  (Abnormal) Collected: 07/18/23 0948    Lab Status: Final result Specimen: Swab from Nares Updated: 07/18/23 1129     MRSA PCR MRSA Detected    Narrative:      The negative predictive value of this diagnostic test is high and should only be used to consider de-escalating anti-MRSA therapy. A positive result may indicate colonization with MRSA and must be correlated clinically.    Blood Culture - Blood, Arm, Left [[id number]]  (Normal) Collected: 07/17/23 1620    Lab Status: Preliminary result Specimen: Blood from Arm, Left Updated: 07/19/23 1631     Blood Culture  No growth at 2 days    Blood Culture - Blood, Arm, Right [[id number]]  (Normal) Collected: 07/17/23 1620    Lab Status: Preliminary result Specimen: Blood from Arm, Right Updated: 07/19/23 1631     Blood Culture No growth at 2 days            PROCEDURES:    [unfilled]    XR Foot 3+ View Right    Result Date: 7/19/2023  Impression:  Changes from interval amputation of 2nd digit at MTP joint.  No unexpected complication identified.      VIVIENNE DUVALL MD       Electronically Signed and Approved By: VIVIENNE DUVALL MD on 7/19/2023 at 11:29             MRI Foot Right Without Contrast    Result Date: 7/17/2023  Impression:   1. There is a wound or ulceration of the distal tip of the 2nd digit.  Underlying soft tissue edema is seen throughout the digit indicating underlying soft tissue cellulitis.  There is no definitive abscess. 2. Findings consistent with osteomyelitis involving the distal phalanx of the 2nd digit.  The proximal and middle phalanges appear to be spared.     VIVIENNE LOPEZ MD       Electronically Signed and Approved By: VIVIENNE LOPEZ MD on 7/17/2023 at 15:37              Results for orders placed during the hospital encounter of 07/17/23    Doppler Ankle Brachial Index Single Level CAR    Interpretation Summary    Right Conclusion: The right DIONICIO is unable to be assessed due to vessel incompressibility. Normal digital pressures.    Left Conclusion: The left DIONICIO is unable to be assessed due to vessel incompressibility. Normal digital pressures.    Normal waveforms at the ankle levels bilaterally.  There is bilateral noncompressible nonocclusive tibial level disease.      Results for orders placed during the hospital encounter of 07/17/23    Doppler Ankle Brachial Index Single Level CAR    Interpretation Summary    Right Conclusion: The right DIONICIO is unable to be assessed due to vessel incompressibility. Normal digital pressures.    Left Conclusion: The left DIONICIO is unable to be assessed due to vessel  incompressibility. Normal digital pressures.    Normal waveforms at the ankle levels bilaterally.  There is bilateral noncompressible nonocclusive tibial level disease.          Labs Pending at Discharge:  Pending Labs       Order Current Status    Anaerobic Culture - Surgical Site, Toe, Right In process    Blood Culture - Blood, Arm, Left Preliminary result    Blood Culture - Blood, Arm, Right Preliminary result    Wound Culture - Wound, Toe, Right Preliminary result              Discharge Details        Discharge Medications        New Medications        Instructions Start Date   doxycycline 100 MG capsule  Commonly known as: VIBRAMYCIN   100 mg, Oral, 2 Times Daily      HYDROcodone-acetaminophen 5-325 MG per tablet  Commonly known as: NORCO   1 tablet, Oral, Every 4 Hours PRN             Continue These Medications        Instructions Start Date   atorvastatin 20 MG tablet  Commonly known as: LIPITOR   20 mg, Oral, Nightly      BASAGLAR KWIKPEN 100 UNIT/ML injection pen   35 Units, Subcutaneous, 2 Times Daily      HumaLOG KwikPen 100 UNIT/ML solution pen-injector  Generic drug: Insulin Lispro (1 Unit Dial)   Inject 25 Units under the skin into the appropriate area as directed 3 (Three) Times a Day Before Meals. PER SLIDING SCALE      hydroCHLOROthiazide 25 MG tablet  Commonly known as: HYDRODIURIL   25 mg, Oral, Daily      Jardiance 10 MG tablet tablet  Generic drug: empagliflozin   Take 1 tablet by mouth Daily.      levothyroxine 25 MCG tablet  Commonly known as: SYNTHROID, LEVOTHROID   Take 1 tablet by mouth Every Morning.      Linzess 145 MCG capsule capsule  Generic drug: linaclotide   145 mcg, Oral, Daily      losartan 100 MG tablet  Commonly known as: COZAAR   100 mg, Oral, Daily      metoprolol succinate XL 50 MG 24 hr tablet  Commonly known as: TOPROL-XL   25 mg, Oral, Every Evening      metoprolol succinate XL 50 MG 24 hr tablet  Commonly known as: TOPROL-XL   1 tablet Every Morning.               No  Known Allergies      Discharge Disposition:    Home or Self Care    Diet:    Heart healthy 1800 ADA    Discharge Activity:     Activity Instructions       Activity as Tolerated                No future appointments.    Additional Instructions for the Follow-ups that You Need to Schedule       Discharge Follow-up with PCP   As directed       Currently Documented PCP:    Everton Chaney MD    PCP Phone Number:    [phone]     Follow Up Details: Next week         Discharge Follow-up with Specified Provider: PodiatristAnay foot and ankle to be seen on Monday   As directed      To: PodiatristAnay foot and ankle to be seen on Monday                 Time spent on Discharge including face to face service: 20 minutes.            I have dictated this note utilizing Dragon Dictation.             Please note that portions of this note were completed with a voice recognition program.             Part of this note may be an electronic transcription/translation of spoken language to printed text         using the Dragon Dictation System.       Electronically signed by Everton Chaney MD, 07/20/23, 8:50 AM EDT.

## 2023-07-21 LAB
CYTO UR: NORMAL
LAB AP CASE REPORT: NORMAL
LAB AP CLINICAL INFORMATION: NORMAL
PATH REPORT.FINAL DX SPEC: NORMAL
PATH REPORT.GROSS SPEC: NORMAL

## 2023-07-22 LAB
BACTERIA SPEC AEROBE CULT: NORMAL
GRAM STN SPEC: NORMAL

## 2023-07-24 LAB — BACTERIA SPEC ANAEROBE CULT: NORMAL

## 2023-07-31 ENCOUNTER — READMISSION MANAGEMENT (OUTPATIENT)
Dept: CALL CENTER | Facility: HOSPITAL | Age: 53
End: 2023-07-31
Payer: MEDICARE

## 2023-08-03 ENCOUNTER — READMISSION MANAGEMENT (OUTPATIENT)
Dept: CALL CENTER | Facility: HOSPITAL | Age: 53
End: 2023-08-03
Payer: MEDICARE

## 2025-06-26 ENCOUNTER — TELEPHONE (OUTPATIENT)
Dept: GASTROENTEROLOGY | Facility: CLINIC | Age: 55
End: 2025-06-26
Payer: MEDICARE

## 2025-06-26 ENCOUNTER — PREP FOR SURGERY (OUTPATIENT)
Dept: OTHER | Facility: HOSPITAL | Age: 55
End: 2025-06-26
Payer: MEDICARE

## 2025-06-26 ENCOUNTER — CLINICAL SUPPORT (OUTPATIENT)
Dept: GASTROENTEROLOGY | Facility: CLINIC | Age: 55
End: 2025-06-26
Payer: MEDICARE

## 2025-06-26 DIAGNOSIS — Z12.11 ENCOUNTER FOR SCREENING FOR MALIGNANT NEOPLASM OF COLON: Primary | ICD-10-CM

## 2025-06-26 RX ORDER — POLYETHYLENE GLYCOL 3350, SODIUM SULFATE, SODIUM CHLORIDE, POTASSIUM CHLORIDE, ASCORBIC ACID, SODIUM ASCORBATE 140-9-5.2G
1 KIT ORAL TAKE AS DIRECTED
Qty: 3 EACH | Refills: 0 | Status: SHIPPED | OUTPATIENT
Start: 2025-06-26 | End: 2025-06-27

## 2025-06-26 NOTE — PROGRESS NOTES
Grayson Rosas   1970  55 y.o.    Reason for call: Screening Colonoscopy  If recall, please list diagnosis:  Please note this diagnosis should be entered in the case request  Prep prescribed: LABS  Prep instructions reviewed with patient and sent to patient via regular mail to the home address on file  Is the patient currently on any injectable or oral medications for weight loss or diabetes? No  Clearance needed? No  If yes, what clearance is needed? N/A  Clearance has been requested from na  The patient has been scheduled for: Colonoscopy    Grayson Rosas JrJosh is aware they have been scheduled for a screening colonoscopy. Patient has expressed they are not having any symptoms at all.         After your procedure, you will be contacted with results. Please confirm the best phone # to reach the patient: [phone]  Family history of colon cancer? No  If yes, indicate relative: na  Tentative Procedure Date: 9.23.2025    Date/Place of last Scope: na  Able to obtain report? no          Family History   Problem Relation Age of Onset    Colon cancer Mother 77    Colon cancer Brother      Past Medical History:   Diagnosis Date    Amputation of toe of left foot     Amputation of toe of right foot     Diabetes mellitus     History of skin graft     Hx of eye surgery      No Known Allergies  Past Surgical History:   Procedure Laterality Date    AMPUTATION DIGIT Right 7/19/2023    Procedure: AMPUTATION 2ND DIGIT;  Surgeon: Alonso Hickey DPM;  Location: [facility];  Service: Podiatry;  Laterality: Right;     Social History     Socioeconomic History    Marital status: Single   Tobacco Use    Smoking status: Never   Vaping Use    Vaping status: Former   Substance and Sexual Activity    Alcohol use: Not Currently    Drug use: Never    Sexual activity: Defer       Current Outpatient Medications:     atorvastatin (LIPITOR) 20 MG tablet, Take 1 tablet by mouth Every Night., Disp: , Rfl:     HumaLOG KwikPen 100  UNIT/ML solution pen-injector, Inject 25 Units under the skin into the appropriate area as directed 3 (Three) Times a Day Before Meals. PER SLIDING SCALE, Disp: , Rfl:     hydroCHLOROthiazide (HYDRODIURIL) 25 MG tablet, Take 1 tablet by mouth Daily., Disp: , Rfl:     Insulin Glargine (BASAGLAR KWIKPEN) 100 UNIT/ML injection pen, Inject 35 Units under the skin into the appropriate area as directed 2 (Two) Times a Day., Disp: , Rfl:     Jardiance 10 MG tablet tablet, Take 1 tablet by mouth Daily., Disp: , Rfl:     levothyroxine (SYNTHROID, LEVOTHROID) 25 MCG tablet, Take 1 tablet by mouth Every Morning., Disp: , Rfl:     losartan (COZAAR) 100 MG tablet, Take 1 tablet by mouth Daily., Disp: , Rfl:     metoprolol succinate XL (TOPROL-XL) 50 MG 24 hr tablet, 1 tablet Every Morning., Disp: , Rfl:     metoprolol succinate XL (TOPROL-XL) 50 MG 24 hr tablet, Take 0.5 tablets by mouth Every Evening., Disp: , Rfl:     Linzess 145 MCG capsule capsule, Take 1 capsule by mouth Daily. (Patient not taking: Reported on 6/26/2025), Disp: , Rfl:

## 2025-06-26 NOTE — TELEPHONE ENCOUNTER
Called patients PCP to get recent labs to send low volume prep.    Once labs received I will send to provider to review.

## 2025-06-27 NOTE — TELEPHONE ENCOUNTER
S/w patient is he aware to pick plenvu from pharmacy. Went over instructions for diet and prep again, prep brochure has been mailed to his address.

## (undated) DEVICE — EXTREMITY-LF: Brand: MEDLINE INDUSTRIES, INC.

## (undated) DEVICE — INTENDED FOR TISSUE SEPARATION, AND OTHER PROCEDURES THAT REQUIRE A SHARP SURGICAL BLADE TO PUNCTURE OR CUT.: Brand: BARD-PARKER ® STAINLESS STEEL BLADES

## (undated) DEVICE — BNDG ESMARK 4IN 12FT LF STRL BLU

## (undated) DEVICE — PENCL E/S SMOKEEVAC W/TELESCP CANN

## (undated) DEVICE — BNDG ELAS ECON W/CLIP 4IN 5YD LF STRL

## (undated) DEVICE — BANDAGE,GAUZE,BULKEE II,4.5"X4.1YD,STRL: Brand: MEDLINE

## (undated) DEVICE — ELECTRD BLD EDGE/STD 1P 2.4X6.35MM STRL

## (undated) DEVICE — GAUZE,SPONGE,4"X4",16PLY,STRL,LF,10/TRAY: Brand: MEDLINE

## (undated) DEVICE — DRSNG PAD ABD 8X10IN STRL

## (undated) DEVICE — DRSNG GZ PETROLTM XEROFORM CURAD 1X8IN STRL

## (undated) DEVICE — SOL IRR NACL 0.9PCT BT 1000ML

## (undated) DEVICE — GOWN,REINF,POLY,SIRUS,BRTH SLV,XLNG/XXL: Brand: MEDLINE

## (undated) DEVICE — STANDARD HYPODERMIC NEEDLE,POLYPROPYLENE HUB: Brand: MONOJECT

## (undated) DEVICE — DISPOSABLE TOURNIQUET CUFF SINGLE BLADDER, SINGLE PORT AND QUICK CONNECT CONNECTOR: Brand: COLOR CUFF

## (undated) DEVICE — GLV SURG SENSICARE SLT PF LF 9 STRL